# Patient Record
Sex: FEMALE | Race: WHITE | NOT HISPANIC OR LATINO | URBAN - METROPOLITAN AREA
[De-identification: names, ages, dates, MRNs, and addresses within clinical notes are randomized per-mention and may not be internally consistent; named-entity substitution may affect disease eponyms.]

---

## 2024-07-31 NOTE — PROGRESS NOTES
INITIAL EVALUATION     PROBLEM LIST:  1. GSM  2. Vaginal atrophy       HISTORY OF PRESENT ILLNESS:   Katerina Abdi is a 79 y.o. female who presents to me today for evaluation of vaginal atrophy. She was diagnosed with vaginal atrophy in 2022 and was put on estradiol 0.01% vaginal cream and Myrbetriq 50 mg in 2022 which worked well for her. Her provider at the time wanted to switch her to highest dosage of amitriptyline which she did not want to use. She has a history of UTI's and experiencing intense burning, urinary urgency, and sees an occasional filmy tissue in the toilet.     PAST MEDICAL HISTORY:  No past medical history on file.    PAST SURGICAL HISTORY:  No past surgical history on file.     ALLERGIES:   Allergies   Allergen Reactions    Cefprozil Unknown           SOCIAL HISTORY:  Patient     Social History     Socioeconomic History    Marital status:      Spouse name: Not on file    Number of children: Not on file    Years of education: Not on file    Highest education level: Not on file   Occupational History    Not on file   Tobacco Use    Smoking status: Not on file    Smokeless tobacco: Not on file   Substance and Sexual Activity    Alcohol use: Not on file    Drug use: Not on file    Sexual activity: Not on file   Other Topics Concern    Not on file   Social History Narrative    Not on file     Social Determinants of Health     Financial Resource Strain: Not on file   Food Insecurity: Not on file   Transportation Needs: Not on file   Physical Activity: Not on file   Stress: Not on file   Social Connections: Not on file   Intimate Partner Violence: Not on file   Housing Stability: Not on file       FAMILY HISTORY:  No family history on file.    REVIEW OF SYSTEMS:  Constitutional: Negative for fever and chills. Denies anorexia, weight loss.  Eyes: Negative for visual disturbance.   Respiratory: Negative for shortness of breath.    Cardiovascular: Negative for chest pain.   Gastrointestinal:  "Negative for nausea and vomiting.   Genitourinary: See interval history above.  Skin: Negative for rash.   Neurological: Negative for dizziness and numbness.   Psychiatric/Behavioral: Negative for confusion and decreased concentration.     PHYSICAL EXAM:  Blood pressure 148/81, pulse 68, temperature 36.4 °C (97.5 °F), height 1.651 m (5' 5\"), weight 71.7 kg (158 lb).  Constitutional: Patient appears well-developed and well-nourished. No distress.    Head: Normocephalic and atraumatic.    Neck: Normal range of motion.    Cardiovascular: Normal rate.    Pulmonary/Chest: Effort normal. No respiratory distress.   Musculoskeletal: Normal range of motion.    Neurological: Alert and oriented to person, place, and time.  Psychiatric: Normal mood and affect. Behavior is normal. Thought content normal.       : no signs of caruncle, or dermatosis    Assessment:      1. Gross hematuria  US renal complete    Urine culture    Urine culture      2. Genitourinary syndrome of menopause            Katerina Abdi is a 79 y.o. female with GSM and vaginal atrophy. We collected a urinanalysis today which was negative for UTI. She will start Intrarosa 6.5 mg insert and stop taking the Myrbetriq. She will also start taking Macrobid BID for the next 7 days.      Plan:   Prescription for Intrarosa 6.5 mg insert sent to the patient's pharmacy.   Prescription for Macrobid 100 mg sent to the patient's pharmacy.   Order renal ultrasound.   If no UTI will recommend cysto due to ?hematuria            Scribe Attestation  By signing my name below, IMaddy Scribe   attest that this documentation has been prepared under the direction and in the presence of Jeni Falcon MD MPH.  "

## 2024-08-02 ENCOUNTER — APPOINTMENT (OUTPATIENT)
Dept: UROLOGY | Facility: CLINIC | Age: 79
End: 2024-08-02
Payer: MEDICARE

## 2024-08-02 VITALS
TEMPERATURE: 97.5 F | DIASTOLIC BLOOD PRESSURE: 81 MMHG | WEIGHT: 158 LBS | HEART RATE: 68 BPM | BODY MASS INDEX: 26.33 KG/M2 | SYSTOLIC BLOOD PRESSURE: 148 MMHG | HEIGHT: 65 IN

## 2024-08-02 DIAGNOSIS — N95.8 GENITOURINARY SYNDROME OF MENOPAUSE: ICD-10-CM

## 2024-08-02 DIAGNOSIS — R31.0 GROSS HEMATURIA: ICD-10-CM

## 2024-08-02 PROCEDURE — 87186 SC STD MICRODIL/AGAR DIL: CPT

## 2024-08-02 PROCEDURE — 1159F MED LIST DOCD IN RCRD: CPT | Performed by: OBSTETRICS & GYNECOLOGY

## 2024-08-02 PROCEDURE — 99204 OFFICE O/P NEW MOD 45 MIN: CPT | Performed by: OBSTETRICS & GYNECOLOGY

## 2024-08-02 PROCEDURE — 87086 URINE CULTURE/COLONY COUNT: CPT

## 2024-08-02 RX ORDER — AMOXICILLIN 500 MG/1
CAPSULE ORAL
COMMUNITY
Start: 2024-07-22

## 2024-08-02 RX ORDER — METHENAMINE HIPPURATE 1000 MG/1
1 TABLET ORAL DAILY
COMMUNITY
Start: 2024-06-26 | End: 2024-08-25

## 2024-08-02 RX ORDER — PRASTERONE 6.5 MG/1
6.5 INSERT VAGINAL NIGHTLY
Qty: 30 EACH | Refills: 5 | Status: SHIPPED | OUTPATIENT
Start: 2024-08-02

## 2024-08-02 RX ORDER — CARVEDILOL 6.25 MG/1
6.25 TABLET ORAL DAILY PRN
COMMUNITY
Start: 2016-11-29

## 2024-08-02 RX ORDER — NITROFURANTOIN 25; 75 MG/1; MG/1
100 CAPSULE ORAL 2 TIMES DAILY
Qty: 14 CAPSULE | Refills: 0 | Status: SHIPPED | OUTPATIENT
Start: 2024-08-02 | End: 2024-08-09

## 2024-08-02 RX ORDER — FLUTICASONE PROPIONATE 50 MCG
1 SPRAY, SUSPENSION (ML) NASAL DAILY PRN
COMMUNITY
Start: 2023-01-07

## 2024-08-02 RX ORDER — MIRABEGRON 50 MG/1
50 TABLET, FILM COATED, EXTENDED RELEASE ORAL
COMMUNITY
Start: 2015-11-24

## 2024-08-02 RX ORDER — ALPRAZOLAM 0.25 MG/1
0.25 TABLET ORAL EVERY 8 HOURS PRN
COMMUNITY

## 2024-08-02 RX ORDER — ESTRADIOL 0.1 MG/G
2 CREAM VAGINAL 2 TIMES WEEKLY
COMMUNITY

## 2024-08-02 RX ORDER — ALENDRONATE SODIUM 70 MG/1
1 TABLET ORAL WEEKLY
COMMUNITY
Start: 2024-06-22

## 2024-08-02 RX ORDER — LOSARTAN POTASSIUM AND HYDROCHLOROTHIAZIDE 12.5; 1 MG/1; MG/1
1 TABLET ORAL
COMMUNITY
Start: 2024-06-22

## 2024-08-04 LAB — BACTERIA UR CULT: ABNORMAL

## 2024-08-05 LAB — BACTERIA UR CULT: ABNORMAL

## 2024-08-06 DIAGNOSIS — N39.0 ACUTE URINARY TRACT INFECTION: ICD-10-CM

## 2024-08-06 RX ORDER — CIPROFLOXACIN 500 MG/1
500 TABLET ORAL 2 TIMES DAILY
Qty: 14 TABLET | Refills: 0 | Status: SHIPPED | OUTPATIENT
Start: 2024-08-06 | End: 2024-08-13

## 2024-08-13 ENCOUNTER — HOSPITAL ENCOUNTER (OUTPATIENT)
Dept: RADIOLOGY | Facility: CLINIC | Age: 79
Discharge: HOME | End: 2024-08-13
Payer: MEDICARE

## 2024-08-13 DIAGNOSIS — R31.0 GROSS HEMATURIA: ICD-10-CM

## 2024-08-13 PROCEDURE — 76770 US EXAM ABDO BACK WALL COMP: CPT | Performed by: STUDENT IN AN ORGANIZED HEALTH CARE EDUCATION/TRAINING PROGRAM

## 2024-08-13 PROCEDURE — 76770 US EXAM ABDO BACK WALL COMP: CPT

## 2024-08-16 DIAGNOSIS — Z13.228 SCREENING FOR METABOLIC DISORDER: ICD-10-CM

## 2024-08-16 DIAGNOSIS — R31.0 GROSS HEMATURIA: ICD-10-CM

## 2024-08-21 ENCOUNTER — LAB (OUTPATIENT)
Dept: LAB | Facility: LAB | Age: 79
End: 2024-08-21
Payer: MEDICARE

## 2024-08-21 DIAGNOSIS — Z13.228 SCREENING FOR METABOLIC DISORDER: ICD-10-CM

## 2024-08-21 LAB
CREAT SERPL-MCNC: 0.71 MG/DL (ref 0.5–1.05)
EGFRCR SERPLBLD CKD-EPI 2021: 87 ML/MIN/1.73M*2

## 2024-08-21 PROCEDURE — 82565 ASSAY OF CREATININE: CPT

## 2024-08-21 PROCEDURE — 36415 COLL VENOUS BLD VENIPUNCTURE: CPT

## 2024-08-27 ENCOUNTER — HOSPITAL ENCOUNTER (OUTPATIENT)
Dept: RADIOLOGY | Facility: HOSPITAL | Age: 79
Discharge: HOME | End: 2024-08-27
Payer: MEDICARE

## 2024-08-27 DIAGNOSIS — R31.0 GROSS HEMATURIA: ICD-10-CM

## 2024-08-27 PROCEDURE — 74178 CT ABD&PLV WO CNTR FLWD CNTR: CPT | Performed by: RADIOLOGY

## 2024-08-27 PROCEDURE — 76377 3D RENDER W/INTRP POSTPROCES: CPT

## 2024-08-27 PROCEDURE — 76377 3D RENDER W/INTRP POSTPROCES: CPT | Performed by: RADIOLOGY

## 2024-08-27 PROCEDURE — 2550000001 HC RX 255 CONTRASTS: Performed by: OBSTETRICS & GYNECOLOGY

## 2024-08-30 ENCOUNTER — APPOINTMENT (OUTPATIENT)
Dept: UROLOGY | Facility: CLINIC | Age: 79
End: 2024-08-30
Payer: MEDICARE

## 2024-09-30 ENCOUNTER — APPOINTMENT (OUTPATIENT)
Dept: UROLOGY | Facility: CLINIC | Age: 79
End: 2024-09-30
Payer: MEDICARE

## 2024-09-30 VITALS — HEIGHT: 65 IN | WEIGHT: 155 LBS | BODY MASS INDEX: 25.83 KG/M2 | TEMPERATURE: 96.9 F

## 2024-09-30 DIAGNOSIS — N21.0 CALCULUS, BLADDER: ICD-10-CM

## 2024-09-30 DIAGNOSIS — R31.0 GROSS HEMATURIA: Primary | ICD-10-CM

## 2024-09-30 DIAGNOSIS — N20.0 KIDNEY STONES: ICD-10-CM

## 2024-09-30 PROBLEM — I10 HYPERTENSION: Status: ACTIVE | Noted: 2024-09-30

## 2024-09-30 PROBLEM — R39.9 UTI SYMPTOMS: Status: ACTIVE | Noted: 2024-07-03

## 2024-09-30 PROBLEM — J06.9 ACUTE UPPER RESPIRATORY INFECTION: Status: ACTIVE | Noted: 2024-09-30

## 2024-09-30 PROBLEM — N95.2 VAGINAL ATROPHY: Status: ACTIVE | Noted: 2022-01-01

## 2024-09-30 LAB
POC APPEARANCE, URINE: ABNORMAL
POC BILIRUBIN, URINE: NEGATIVE
POC BLOOD, URINE: ABNORMAL
POC COLOR, URINE: YELLOW
POC GLUCOSE, URINE: NEGATIVE MG/DL
POC KETONES, URINE: NEGATIVE MG/DL
POC LEUKOCYTES, URINE: ABNORMAL
POC NITRITE,URINE: NEGATIVE
POC PH, URINE: 7 PH
POC PROTEIN, URINE: ABNORMAL MG/DL
POC SPECIFIC GRAVITY, URINE: >=1.03
POC UROBILINOGEN, URINE: 0.2 EU/DL

## 2024-09-30 PROCEDURE — 1159F MED LIST DOCD IN RCRD: CPT | Performed by: SURGERY

## 2024-09-30 PROCEDURE — 1125F AMNT PAIN NOTED PAIN PRSNT: CPT | Performed by: SURGERY

## 2024-09-30 PROCEDURE — 99204 OFFICE O/P NEW MOD 45 MIN: CPT | Performed by: SURGERY

## 2024-09-30 PROCEDURE — 1036F TOBACCO NON-USER: CPT | Performed by: SURGERY

## 2024-09-30 PROCEDURE — 81002 URINALYSIS NONAUTO W/O SCOPE: CPT | Performed by: SURGERY

## 2024-09-30 PROCEDURE — 1160F RVW MEDS BY RX/DR IN RCRD: CPT | Performed by: SURGERY

## 2024-09-30 ASSESSMENT — PAIN SCALES - GENERAL: PAINLEVEL: 3

## 2024-09-30 NOTE — PROGRESS NOTES
Subjective   Patient ID: Katerina Abdi is a 79 y.o. female who presents for bladder stones.      HPI  She was referred to me by Dr. Jeni Falcon.  She has been treated for postmenopausal vaginitis.  She has been having recurrent irritative voiding symptoms, notably dysuria and frequency.  She previously was treated with Myrbetriq.  Further investigation revealed microscopic hematuria.  This prompted imaging, and a CT scan done revealed a 3 cm calculus within the urinary bladder.  She reports a prior history of kidney stones.      Review of Systems  As per HPI, remaining 10 systems negative        Objective   Physical Exam  Well nourished  Breathing comfortably  Abdomen soft, ND, NT      Imaging Reviewed: CT Urogram  Both kidneys are normal in size.  There is no evidence of any renal masses, stones, or hydronephrosis.  Within the bladder there is a 3 cm ovoid calculus.  The ureters are unremarkable.          Assessment/Plan   Problem List Items Addressed This Visit    None  Visit Diagnoses         Codes    Gross hematuria    -  Primary R31.0    Relevant Orders    POCT UA (nonautomated) manually resulted (Completed)    Calculus, bladder     N21.0    Relevant Orders    Case Request Operating Room: Cystoscopy with Litholapaxy (Completed)               I reviewed her imaging findings at length.  She has a large calculus within the bladder.  We discussed treatment options.  The best option is a laser cystolitholapaxy.  I counseled her on the risks of surgery.  I will schedule this as soon as possible.        Hakan Coles MD 09/30/24 11:00 AM

## 2024-10-01 PROBLEM — N21.0 CALCULUS, BLADDER: Status: ACTIVE | Noted: 2024-09-30

## 2024-10-30 ENCOUNTER — LAB (OUTPATIENT)
Dept: LAB | Facility: LAB | Age: 79
End: 2024-10-30
Payer: MEDICARE

## 2024-10-30 ENCOUNTER — PRE-ADMISSION TESTING (OUTPATIENT)
Dept: PREADMISSION TESTING | Facility: HOSPITAL | Age: 79
End: 2024-10-30
Payer: MEDICARE

## 2024-10-30 VITALS
SYSTOLIC BLOOD PRESSURE: 148 MMHG | HEIGHT: 65 IN | TEMPERATURE: 98.6 F | BODY MASS INDEX: 26.11 KG/M2 | WEIGHT: 156.7 LBS | OXYGEN SATURATION: 99 % | HEART RATE: 67 BPM | DIASTOLIC BLOOD PRESSURE: 67 MMHG

## 2024-10-30 DIAGNOSIS — Z01.818 PREOP TESTING: ICD-10-CM

## 2024-10-30 DIAGNOSIS — Z01.818 PREOPERATIVE TESTING: Primary | ICD-10-CM

## 2024-10-30 DIAGNOSIS — Z01.818 PREOPERATIVE TESTING: ICD-10-CM

## 2024-10-30 LAB
ANION GAP SERPL CALCULATED.3IONS-SCNC: 11 MMOL/L (ref 10–20)
BUN SERPL-MCNC: 17 MG/DL (ref 6–23)
CALCIUM SERPL-MCNC: 9.4 MG/DL (ref 8.6–10.3)
CHLORIDE SERPL-SCNC: 109 MMOL/L (ref 98–107)
CO2 SERPL-SCNC: 27 MMOL/L (ref 21–32)
CREAT SERPL-MCNC: 0.76 MG/DL (ref 0.5–1.05)
EGFRCR SERPLBLD CKD-EPI 2021: 80 ML/MIN/1.73M*2
ERYTHROCYTE [DISTWIDTH] IN BLOOD BY AUTOMATED COUNT: 13.1 % (ref 11.5–14.5)
GLUCOSE SERPL-MCNC: 86 MG/DL (ref 74–99)
HCT VFR BLD AUTO: 38.7 % (ref 36–46)
HGB BLD-MCNC: 12.7 G/DL (ref 12–16)
MCH RBC QN AUTO: 31.3 PG (ref 26–34)
MCHC RBC AUTO-ENTMCNC: 32.8 G/DL (ref 32–36)
MCV RBC AUTO: 95 FL (ref 80–100)
NRBC BLD-RTO: 0 /100 WBCS (ref 0–0)
PLATELET # BLD AUTO: 327 X10*3/UL (ref 150–450)
POTASSIUM SERPL-SCNC: 4.3 MMOL/L (ref 3.5–5.3)
RBC # BLD AUTO: 4.06 X10*6/UL (ref 4–5.2)
SODIUM SERPL-SCNC: 143 MMOL/L (ref 136–145)
WBC # BLD AUTO: 7.8 X10*3/UL (ref 4.4–11.3)

## 2024-10-30 PROCEDURE — 99203 OFFICE O/P NEW LOW 30 MIN: CPT | Performed by: NURSE PRACTITIONER

## 2024-10-30 PROCEDURE — 85027 COMPLETE CBC AUTOMATED: CPT

## 2024-10-30 PROCEDURE — 36415 COLL VENOUS BLD VENIPUNCTURE: CPT

## 2024-10-30 PROCEDURE — 80048 BASIC METABOLIC PNL TOTAL CA: CPT

## 2024-10-30 RX ORDER — NAPROXEN SODIUM 220 MG/1
81 TABLET, FILM COATED ORAL DAILY
COMMUNITY

## 2024-10-30 ASSESSMENT — DUKE ACTIVITY SCORE INDEX (DASI)
DASI METS SCORE: 8
CAN YOU DO YARD WORK LIKE RAKING LEAVES, WEEDING OR PUSHING A MOWER: YES
CAN YOU PARTICIPATE IN STRENOUS SPORTS LIKE SWIMMING, SINGLES TENNIS, FOOTBALL, BASKETBALL, OR SKIING: NO
CAN YOU DO LIGHT WORK AROUND THE HOUSE LIKE DUSTING OR WASHING DISHES: YES
TOTAL_SCORE: 42.7
CAN YOU WALK A BLOCK OR TWO ON LEVEL GROUND: YES
CAN YOU RUN A SHORT DISTANCE: NO
CAN YOU TAKE CARE OF YOURSELF (EAT, DRESS, BATHE, OR USE TOILET): YES
CAN YOU CLIMB A FLIGHT OF STAIRS OR WALK UP A HILL: YES
CAN YOU DO HEAVY WORK AROUND THE HOUSE LIKE SCRUBBING FLOORS OR LIFTING AND MOVING HEAVY FURNITURE: YES
CAN YOU DO MODERATE WORK AROUND THE HOUSE LIKE VACUUMING, SWEEPING FLOORS OR CARRYING GROCERIES: YES
CAN YOU WALK INDOORS, SUCH AS AROUND YOUR HOUSE: YES
CAN YOU HAVE SEXUAL RELATIONS: YES
CAN YOU PARTICIPATE IN MODERATE RECREATIONAL ACTIVITIES LIKE GOLF, BOWLING, DANCING, DOUBLES TENNIS OR THROWING A BASEBALL OR FOOTBALL: YES

## 2024-10-30 ASSESSMENT — PAIN DESCRIPTION - DESCRIPTORS: DESCRIPTORS: BURNING;DISCOMFORT

## 2024-10-30 ASSESSMENT — ENCOUNTER SYMPTOMS
HEMATOLOGIC/LYMPHATIC NEGATIVE: 1
CARDIOVASCULAR NEGATIVE: 1
EYES NEGATIVE: 1
PSYCHIATRIC NEGATIVE: 1
NEUROLOGICAL NEGATIVE: 1
FREQUENCY: 1
MUSCULOSKELETAL NEGATIVE: 1
ALLERGIC/IMMUNOLOGIC NEGATIVE: 1
RESPIRATORY NEGATIVE: 1
ENDOCRINE NEGATIVE: 1
CONSTITUTIONAL NEGATIVE: 1
GASTROINTESTINAL NEGATIVE: 1
DYSURIA: 1

## 2024-10-30 ASSESSMENT — PAIN - FUNCTIONAL ASSESSMENT: PAIN_FUNCTIONAL_ASSESSMENT: 0-10

## 2024-10-30 ASSESSMENT — PAIN SCALES - GENERAL: PAINLEVEL_OUTOF10: 3

## 2024-10-30 NOTE — H&P (VIEW-ONLY)
[facility]/PAT Evaluation       Name: Katerina Abdi (Katerina Abdi)  /Age: 1945/79 y.o.     In-Person       Chief Complaint: bladder stone    HPI    79 year old female with bladder stones. Has irritative voiding symptoms-dysuria and frequency. CT scan 24 showed a 3 cm calculus in urinary bladder. Denies fever, chills, nausea, vomiting, chest pain, or SOB.    Scheduled for cystoscopy with litholapaxy 24    Past Medical History:   Diagnosis Date    Fibroid     HYSTERECTOMY    Fractures     Right ankle    Hypertension     Joint pain     Two knee replacements    Urinary tract infection     Occasionally       Past Surgical History:   Procedure Laterality Date    ANKLE FRACTURE SURGERY  ?    CATARACT EXTRACTION      COLONOSCOPY      said more can't be done due to curvature of colon.    HYSTERECTOMY      JOINT REPLACEMENT  ,     Knee replacements    OTHER SURGICAL HISTORY  ,, ankle?,     knee replacements, right ankle, hysterectomy    TONSILLECTOMY  about 6 years old       Patient  reports that she is not currently sexually active and has had partner(s) who are male. She reports using the following method of birth control/protection: None.    Family History   Problem Relation Name Age of Onset    Heart disease Father iMke Smith     Hypertension Father Mike Smith        Allergies   Allergen Reactions    Cefprozil Unknown       Prior to Admission medications    Medication Sig Start Date End Date Taking? Authorizing Provider   alendronate (Fosamax) 70 mg tablet Take 1 tablet (70 mg) by mouth once a week. 24   Historical Provider, MD   ALPRAZolam (Xanax) 0.25 mg tablet Take 1 tablet (0.25 mg) by mouth every 8 hours if needed.    Historical Provider, MD   amoxicillin (Amoxil) 500 mg capsule TAKE 4 CAPSULES BY MOUTH ONE HOUR PRIOR TO DENTAL APPOINTMENT 24   Historical Provider, MD   carvedilol (Coreg) 6.25 mg tablet Take 1 tablet (6.25 mg) by mouth once daily as  needed. 11/29/16   Historical Provider, MD   fluticasone (Flonase) 50 mcg/actuation nasal spray Administer 1 spray into each nostril once daily as needed. 1/7/23   Historical Provider, MD   losartan-hydrochlorothiazide (Hyzaar) 100-12.5 mg tablet Take 1 tablet by mouth early in the morning.. 6/22/24   Historical Provider, MD   Myrbetriq 50 mg tablet extended release 24 hr 24 hr tablet Take 1 tablet (50 mg) by mouth once daily. 11/24/15   Historical Provider, MD   prasterone, dhea, (Intrarosa) 6.5 mg insert Insert 6.5 mg into the vagina once daily at bedtime. 8/2/24   Jeni Falcon MD MPH      Review of Systems   Constitutional: Negative.    HENT: Negative.     Eyes: Negative.    Respiratory: Negative.     Cardiovascular: Negative.    Gastrointestinal: Negative.    Endocrine: Negative.    Genitourinary:  Positive for dysuria and frequency.   Musculoskeletal: Negative.    Skin: Negative.    Allergic/Immunologic: Negative.    Neurological: Negative.    Hematological: Negative.    Psychiatric/Behavioral: Negative.           Physical Exam  HENT:      Head: Normocephalic.      Mouth/Throat:      Mouth: Mucous membranes are moist.   Eyes:      Extraocular Movements: Extraocular movements intact.      Pupils: Pupils are equal, round, and reactive to light.   Cardiovascular:      Rate and Rhythm: Normal rate and regular rhythm.      Pulses: Normal pulses.      Heart sounds: Normal heart sounds.   Pulmonary:      Effort: Pulmonary effort is normal.      Breath sounds: Normal breath sounds.   Abdominal:      General: Bowel sounds are normal.   Musculoskeletal:         General: Normal range of motion.   Skin:     General: Skin is warm.      Capillary Refill: Capillary refill takes less than 2 seconds.   Neurological:      General: No focal deficit present.      Mental Status: She is alert.   Psychiatric:         Mood and Affect: Mood normal.        PAT AIRWAY:   Airway:     Mallampati::  III    Neck ROM::  Full  normal        BP  "148/67   Pulse 67   Temp 37 °C (98.6 °F) (Temporal)   Ht 1.651 m (5' 5\")   Wt 71.1 kg (156 lb 11.2 oz)   SpO2 99%   BMI 26.08 kg/m²         DASI Risk Score      Flowsheet Row Pre-Admission Testing from 10/30/2024 in [facility]   Can you take care of yourself (eat, dress, bathe, or use toilet)?  2.75 filed at 10/30/2024 1224   Can you walk indoors, such as around your house? 1.75 filed at 10/30/2024 1224   Can you walk a block or two on level ground?  2.75 filed at 10/30/2024 1224   Can you climb a flight of stairs or walk up a hill? 5.5 filed at 10/30/2024 1224   Can you run a short distance? 0 filed at 10/30/2024 1224   Can you do light work around the house like dusting or washing dishes? 2.7 filed at 10/30/2024 1224   Can you do moderate work around the house like vacuuming, sweeping floors or carrying groceries? 3.5 filed at 10/30/2024 1224   Can you do heavy work around the house like scrubbing floors or lifting and moving heavy furniture?  8 filed at 10/30/2024 1224   Can you do yard work like raking leaves, weeding or pushing a mower? 4.5 filed at 10/30/2024 1224   Can you have sexual relations? 5.25 filed at 10/30/2024 1224   Can you participate in moderate recreational activities like golf, bowling, dancing, doubles tennis or throwing a baseball or football? 6 filed at 10/30/2024 1224   Can you participate in strenous sports like swimming, singles tennis, football, basketball, or skiing? 0 filed at 10/30/2024 1224   DASI SCORE 42.7 filed at 10/30/2024 1224   METS Score (Will be calculated only when all the questions are answered) 8 filed at 10/30/2024 1224          Caprini DVT Assessment    No data to display       Modified Frailty Index    No data to display       CHADS2 Stroke Risk  Current as of about an hour ago        N/A 3 to 100%: High Risk   2 to < 3%: Medium Risk   0 to < 2%: Low Risk     Last Change: N/A          This score determines the patient's risk of having a stroke if " the patient has atrial fibrillation.        This score is not applicable to this patient. Components are not calculated.          Revised Cardiac Risk Index      Flowsheet Row Pre-Admission Testing from 10/30/2024 in [facility]   High-Risk Surgery (Intraperitoneal, Intrathoracic,Suprainguinal vascular) 0 filed at 10/30/2024 1229   History of congestive heart failure (pulmonary edemia, bilateral rales or S3 gallop, Paroxysmal nocturnal dyspnea, CXR showing pulmonary vascular redistribution) 0 filed at 10/30/2024 1229   History of cerebrovascular disease (Prior TIA or stroke) 0 filed at 10/30/2024 1229   Pre-operative insulin treatment 0 filed at 10/30/2024 1229   Pre-operative creatinine>2 mg/dl 0 filed at 10/30/2024 1229          Apfel Simplified Score    No data to display       Risk Analysis Index Results This Encounter    No data found in the last 10 encounters.       Stop Bang Score      Flowsheet Row Pre-Admission Testing from 10/30/2024 in [facility]   Do you snore loudly? 0 filed at 10/30/2024 1224   Do you often feel tired or fatigued after your sleep? 0 filed at 10/30/2024 1224   Has anyone ever observed you stop breathing in your sleep? 0 filed at 10/30/2024 1224   Do you have or are you being treated for high blood pressure? 1 filed at 10/30/2024 1224   Recent BMI (Calculated) 26.1 filed at 10/30/2024 1224   Is BMI greater than 35 kg/m2? 0=No filed at 10/30/2024 1224   Age older than 50 years old? 1=Yes filed at 10/30/2024 1224   Is your neck circumference greater than 17 inches (Male) or 16 inches (Female)? 0 filed at 10/30/2024 1224   Gender - Male 0=No filed at 10/30/2024 1224   STOP-BANG Total Score 2 filed at 10/30/2024 1224          Prodigy: High Risk  Total Score: 12              Prodigy Age Score           ARISCAT Score for Postoperative Pulmonary Complications    No data to display       Bishop Perioperative Risk for Myocardial Infarction or Cardiac Arrest (ALAYNA)     No data to display       ASA: II  DASI Risk Score: 42.7  METS: 8  CHADS2: 1  REVISED CARDIAC RISK INDEX: 0.4%  STOPBAN      Assessment and Plan:     Calculus, bladder- scheduled for cystoscopy with litholapaxy 24  HTN-controlled with meds

## 2024-11-06 ENCOUNTER — ANESTHESIA (OUTPATIENT)
Dept: OPERATING ROOM | Facility: HOSPITAL | Age: 79
End: 2024-11-06
Payer: MEDICARE

## 2024-11-06 ENCOUNTER — ANESTHESIA EVENT (OUTPATIENT)
Dept: OPERATING ROOM | Facility: HOSPITAL | Age: 79
End: 2024-11-06
Payer: MEDICARE

## 2024-11-06 ENCOUNTER — HOSPITAL ENCOUNTER (OUTPATIENT)
Facility: HOSPITAL | Age: 79
Setting detail: OUTPATIENT SURGERY
Discharge: HOME | End: 2024-11-06
Attending: SURGERY | Admitting: SURGERY
Payer: MEDICARE

## 2024-11-06 VITALS
HEART RATE: 79 BPM | DIASTOLIC BLOOD PRESSURE: 85 MMHG | TEMPERATURE: 98.2 F | OXYGEN SATURATION: 100 % | HEIGHT: 65 IN | RESPIRATION RATE: 16 BRPM | BODY MASS INDEX: 26.11 KG/M2 | WEIGHT: 156.7 LBS | SYSTOLIC BLOOD PRESSURE: 167 MMHG

## 2024-11-06 DIAGNOSIS — N21.0 CALCULUS, BLADDER: Primary | ICD-10-CM

## 2024-11-06 PROCEDURE — 7100000010 HC PHASE TWO TIME - EACH INCREMENTAL 1 MINUTE: Performed by: SURGERY

## 2024-11-06 PROCEDURE — 3600000008 HC OR TIME - EACH INCREMENTAL 1 MINUTE - PROCEDURE LEVEL THREE: Performed by: SURGERY

## 2024-11-06 PROCEDURE — 52318 REMOVE BLADDER STONE: CPT | Performed by: SURGERY

## 2024-11-06 PROCEDURE — 3600000003 HC OR TIME - INITIAL BASE CHARGE - PROCEDURE LEVEL THREE: Performed by: SURGERY

## 2024-11-06 PROCEDURE — 3700000001 HC GENERAL ANESTHESIA TIME - INITIAL BASE CHARGE: Performed by: SURGERY

## 2024-11-06 PROCEDURE — 3700000002 HC GENERAL ANESTHESIA TIME - EACH INCREMENTAL 1 MINUTE: Performed by: SURGERY

## 2024-11-06 PROCEDURE — 7100000009 HC PHASE TWO TIME - INITIAL BASE CHARGE: Performed by: SURGERY

## 2024-11-06 PROCEDURE — 7100000002 HC RECOVERY ROOM TIME - EACH INCREMENTAL 1 MINUTE: Performed by: SURGERY

## 2024-11-06 PROCEDURE — 7100000001 HC RECOVERY ROOM TIME - INITIAL BASE CHARGE: Performed by: SURGERY

## 2024-11-06 PROCEDURE — 2720000007 HC OR 272 NO HCPCS: Performed by: SURGERY

## 2024-11-06 PROCEDURE — 2500000004 HC RX 250 GENERAL PHARMACY W/ HCPCS (ALT 636 FOR OP/ED): Performed by: ANESTHESIOLOGIST ASSISTANT

## 2024-11-06 PROCEDURE — A52318 PR REMOVE BLADDER STONE,>2.5 CM

## 2024-11-06 PROCEDURE — 99100 ANES PT EXTEME AGE<1 YR&>70: CPT | Performed by: STUDENT IN AN ORGANIZED HEALTH CARE EDUCATION/TRAINING PROGRAM

## 2024-11-06 PROCEDURE — A52318 PR REMOVE BLADDER STONE,>2.5 CM: Performed by: STUDENT IN AN ORGANIZED HEALTH CARE EDUCATION/TRAINING PROGRAM

## 2024-11-06 PROCEDURE — 2500000004 HC RX 250 GENERAL PHARMACY W/ HCPCS (ALT 636 FOR OP/ED): Mod: JZ | Performed by: SURGERY

## 2024-11-06 PROCEDURE — 2500000004 HC RX 250 GENERAL PHARMACY W/ HCPCS (ALT 636 FOR OP/ED): Performed by: STUDENT IN AN ORGANIZED HEALTH CARE EDUCATION/TRAINING PROGRAM

## 2024-11-06 PROCEDURE — 2500000004 HC RX 250 GENERAL PHARMACY W/ HCPCS (ALT 636 FOR OP/ED)

## 2024-11-06 RX ORDER — ONDANSETRON HYDROCHLORIDE 2 MG/ML
INJECTION, SOLUTION INTRAVENOUS AS NEEDED
Status: DISCONTINUED | OUTPATIENT
Start: 2024-11-06 | End: 2024-11-06

## 2024-11-06 RX ORDER — LIDOCAINE HYDROCHLORIDE 10 MG/ML
INJECTION, SOLUTION INFILTRATION; PERINEURAL AS NEEDED
Status: DISCONTINUED | OUTPATIENT
Start: 2024-11-06 | End: 2024-11-06

## 2024-11-06 RX ORDER — OXYCODONE HYDROCHLORIDE 5 MG/1
10 TABLET ORAL EVERY 4 HOURS PRN
Status: DISCONTINUED | OUTPATIENT
Start: 2024-11-06 | End: 2024-11-06 | Stop reason: HOSPADM

## 2024-11-06 RX ORDER — MEPERIDINE HYDROCHLORIDE 25 MG/ML
12.5 INJECTION INTRAMUSCULAR; INTRAVENOUS; SUBCUTANEOUS
Status: DISCONTINUED | OUTPATIENT
Start: 2024-11-06 | End: 2024-11-06 | Stop reason: HOSPADM

## 2024-11-06 RX ORDER — ONDANSETRON HYDROCHLORIDE 2 MG/ML
4 INJECTION, SOLUTION INTRAVENOUS ONCE AS NEEDED
Status: DISCONTINUED | OUTPATIENT
Start: 2024-11-06 | End: 2024-11-06 | Stop reason: HOSPADM

## 2024-11-06 RX ORDER — ACETAMINOPHEN 325 MG/1
650 TABLET ORAL EVERY 4 HOURS PRN
Status: DISCONTINUED | OUTPATIENT
Start: 2024-11-06 | End: 2024-11-06 | Stop reason: HOSPADM

## 2024-11-06 RX ORDER — SODIUM CHLORIDE, SODIUM LACTATE, POTASSIUM CHLORIDE, CALCIUM CHLORIDE 600; 310; 30; 20 MG/100ML; MG/100ML; MG/100ML; MG/100ML
50 INJECTION, SOLUTION INTRAVENOUS CONTINUOUS
Status: DISCONTINUED | OUTPATIENT
Start: 2024-11-06 | End: 2024-11-06 | Stop reason: HOSPADM

## 2024-11-06 RX ORDER — MIDAZOLAM HYDROCHLORIDE 1 MG/ML
INJECTION, SOLUTION INTRAMUSCULAR; INTRAVENOUS AS NEEDED
Status: DISCONTINUED | OUTPATIENT
Start: 2024-11-06 | End: 2024-11-06

## 2024-11-06 RX ORDER — FENTANYL CITRATE 50 UG/ML
INJECTION, SOLUTION INTRAMUSCULAR; INTRAVENOUS AS NEEDED
Status: DISCONTINUED | OUTPATIENT
Start: 2024-11-06 | End: 2024-11-06

## 2024-11-06 RX ORDER — ALBUTEROL SULFATE 0.83 MG/ML
2.5 SOLUTION RESPIRATORY (INHALATION) ONCE
Status: DISCONTINUED | OUTPATIENT
Start: 2024-11-06 | End: 2024-11-06 | Stop reason: HOSPADM

## 2024-11-06 RX ORDER — PROPOFOL 10 MG/ML
INJECTION, EMULSION INTRAVENOUS AS NEEDED
Status: DISCONTINUED | OUTPATIENT
Start: 2024-11-06 | End: 2024-11-06

## 2024-11-06 RX ORDER — FENTANYL CITRATE 50 UG/ML
25 INJECTION, SOLUTION INTRAMUSCULAR; INTRAVENOUS EVERY 5 MIN PRN
Status: DISCONTINUED | OUTPATIENT
Start: 2024-11-06 | End: 2024-11-06 | Stop reason: HOSPADM

## 2024-11-06 RX ORDER — OXYCODONE HYDROCHLORIDE 5 MG/1
5 TABLET ORAL EVERY 4 HOURS PRN
Status: DISCONTINUED | OUTPATIENT
Start: 2024-11-06 | End: 2024-11-06 | Stop reason: HOSPADM

## 2024-11-06 RX ORDER — CEFAZOLIN SODIUM 2 G/100ML
2 INJECTION, SOLUTION INTRAVENOUS ONCE
Status: COMPLETED | OUTPATIENT
Start: 2024-11-06 | End: 2024-11-06

## 2024-11-06 RX ORDER — FENTANYL CITRATE 50 UG/ML
50 INJECTION, SOLUTION INTRAMUSCULAR; INTRAVENOUS EVERY 5 MIN PRN
Status: DISCONTINUED | OUTPATIENT
Start: 2024-11-06 | End: 2024-11-06 | Stop reason: HOSPADM

## 2024-11-06 ASSESSMENT — PAIN - FUNCTIONAL ASSESSMENT
PAIN_FUNCTIONAL_ASSESSMENT: 0-10

## 2024-11-06 ASSESSMENT — PAIN DESCRIPTION - DESCRIPTORS
DESCRIPTORS: ACHING
DESCRIPTORS: BURNING
DESCRIPTORS: ACHING

## 2024-11-06 ASSESSMENT — COLUMBIA-SUICIDE SEVERITY RATING SCALE - C-SSRS
2. HAVE YOU ACTUALLY HAD ANY THOUGHTS OF KILLING YOURSELF?: NO
6. HAVE YOU EVER DONE ANYTHING, STARTED TO DO ANYTHING, OR PREPARED TO DO ANYTHING TO END YOUR LIFE?: NO
1. IN THE PAST MONTH, HAVE YOU WISHED YOU WERE DEAD OR WISHED YOU COULD GO TO SLEEP AND NOT WAKE UP?: NO

## 2024-11-06 ASSESSMENT — PAIN SCALES - GENERAL
PAINLEVEL_OUTOF10: 0 - NO PAIN
PAINLEVEL_OUTOF10: 3
PAINLEVEL_OUTOF10: 4
PAINLEVEL_OUTOF10: 3

## 2024-11-06 NOTE — ANESTHESIA PROCEDURE NOTES
Airway  Date/Time: 11/6/2024 2:11 PM  Urgency: elective    Airway not difficult    Staffing  Performed: BECCA   Authorized by: John Larry DO    Performed by: BECCA Neff  Patient location during procedure: OR    Indications and Patient Condition  Indications for airway management: anesthesia  Spontaneous Ventilation: absent  Sedation level: deep  Preoxygenated: yes  Mask difficulty assessment: 0 - not attempted    Final Airway Details  Final airway type: supraglottic airway      Successful airway: classic  Size 4     Number of attempts at approach: 1

## 2024-11-06 NOTE — ANESTHESIA POSTPROCEDURE EVALUATION
Patient: Katerina Abdi    Procedure Summary       Date: 11/06/24 Room / Location: [facility] OR 12 / Virtual [facility] OR    Anesthesia Start: 1404 Anesthesia Stop: 1538    Procedure: Cystoscopy with Litholapaxy Diagnosis:       Calculus, bladder      (Calculus, bladder [N21.0])    Surgeons: Hakan Coles MD Responsible Provider: John Larry DO    Anesthesia Type: general ASA Status: 3            Anesthesia Type: general    Vitals Value Taken Time   /66 11/06/24 1550   Temp 35.9 °C (96.6 °F) 11/06/24 1534   Pulse 71 11/06/24 1550   Resp 17 11/06/24 1550   SpO2 100 % 11/06/24 1550       Anesthesia Post Evaluation    Patient location during evaluation: bedside  Patient participation: complete - patient participated  Level of consciousness: awake and alert  Pain management: adequate  Multimodal analgesia pain management approach  Airway patency: patent  Two or more strategies used to mitigate risk of obstructive sleep apnea  Cardiovascular status: acceptable  Respiratory status: acceptable  Hydration status: acceptable  Postoperative Nausea and Vomiting: none        There were no known notable events for this encounter.

## 2024-11-06 NOTE — POST-PROCEDURE NOTE
Patient in phase ll and stable. No c/o pain, just some mild lower back pain from positioning.  at bedside. Pt going home with indwelling catheter with appointment for removal tomorrow afternoon in office. Pt and family educated and printed instructions given about heath care and demonstration of how to empty it over night. AVS reviewed and questions answered at this time.  250 mL of clear red urine emptied from heath before DC.

## 2024-11-06 NOTE — OP NOTE
Cystoscopy with Litholapaxy Operative Note     Date: 2024  OR Location: [facility] OR    Name: Katerina Abdi, : 1945, Age: 79 y.o., MRN: [id number], Sex: female    Diagnosis  Pre-op Diagnosis      * Calculus, bladder [N21.0] Post-op Diagnosis     * Calculus, bladder [N21.0]     Procedures  Cystoscopy with Litholapaxy  57400 - [facility] LITHOLAPAXY COMP/LG > 2.5 CM      Surgeons      * Hakan Coles - Primary    Resident/Fellow/Other Assistant:  Surgeons and Role:  * No surgeons found with a matching role *    Staff:   Circulator: Lola  Circulator: Abdifatah  Circulator: Samantha  Scrub Person: Reji  Scrub Person: Morenita Costa Circulator: Jw    Anesthesia Staff: Anesthesiologist: John Larry DO  C-AA: BECCA Lorenzo; BECCA Neff    Procedure Summary  Anesthesia: General  ASA: III  Estimated Blood Loss: 10 mL  Intra-op Medications: Administrations occurring from 1230 to 1400 on 24:  * No intraprocedure medications in log *           Anesthesia Record               Intraprocedure I/O Totals       None           Specimen: No specimens collected              Drains and/or Catheters:   Urethral Catheter Latex 18 Fr. (Active)       Tourniquet Times:         Implants:     Findings: 1. Inflammatory bladder mucosa.                   2. Large bladder calculus, 3 x 3 x 3 cm.        Indications: Katerina Abdi is an 79 y.o. female who is having surgery for Calculus, bladder [N21.0].  She was referred for irritative voiding symptoms.  She had abdominal imaging which revealed a large stone occupying the urinary bladder.  We discussed treatment options and she wished to proceed with a laser ablation.    The patient was seen in the preoperative area. The risks, benefits, complications, treatment options, non-operative alternatives, expected recovery and outcomes were discussed with the patient. The possibilities of reaction to medication, pulmonary aspiration, injury to surrounding structures,  bleeding, recurrent infection, the need for additional procedures, failure to diagnose a condition, and creating a complication requiring transfusion or operation were discussed with the patient. The patient concurred with the proposed plan, giving informed consent.  The site of surgery was properly noted/marked if necessary per policy. The patient has been actively warmed in preoperative area. Preoperative antibiotics have been ordered and given within 1 hours of incision. Venous thrombosis prophylaxis have been ordered including bilateral sequential compression devices    Procedure Details: The patient was brought to the operating room table.  General anesthesia was induced.  The patient was placed in the dorsolithotomy position.  Her genitalia were prepped and draped.  We introduced a 22 Hebrew sheath per urethra and we drained the bladder.  We then introduced a cystoscope.  Once we entered the bladder we carefully inspected the bladder mucosa.  There was a large stone occupying the floor of the bladder.  There were no tumors identified.  There was some mild inflammatory changes noted along the bladder neck.  At this point we then introduced our 1000 µm laser fiber.  We directed our attention to the stone.  We began our laser lithotripsy.  We used a power setting of 30 W and we were able to fragment the stone.  Using continuous irrigation we fragmented the stone quite nicely.  We obtained our Ellik and irrigated out the stone chips.  We continued our laser ablation until we were satisfied with our stone clearance.  We used our Ellik once again to irrigate out any remaining chips.  We did see a small amount of bleeding along the posterior wall of the bladder.  This was carefully controlled using fulguration.  Once we had good hemostasis we then inspected the bladder mucosa once again.  There was no evidence of any other pathology.  At this point we removed our scope.  At the end we placed a Cooley catheter per  urethra and we drained the bladder.  The patient was awakened and brought to the recovery room in stable condition.  Complications:  None; patient tolerated the procedure well.    Disposition: PACU - hemodynamically stable.  Condition: stable                 Additional Details:     Attending Attestation: I was present and scrubbed for the entire procedure.    Hakan Coles  Phone Number: [phone]

## 2024-11-06 NOTE — ANESTHESIA PREPROCEDURE EVALUATION
Patient: Katerina Abdi    Procedure Information       Date/Time: 11/06/24 1230    Procedure: Cystoscopy with Litholapaxy    Location: [facility] OR 12 / [facility]    Surgeons: Hakan Coles MD            Relevant Problems   Cardiac   (+) Hypertension      /Renal   (+) Kidney stones      ID   (+) Acute upper respiratory infection     Past Medical History:   Diagnosis Date    Fibroid     HYSTERECTOMY    Fractures     Right ankle    Hypertension     Joint pain     Two knee replacements    Urinary tract infection     Occasionally     Past Surgical History:   Procedure Laterality Date    ANKLE FRACTURE SURGERY  ?    CATARACT EXTRACTION  2021    COLONOSCOPY  2022    said more can't be done due to curvature of colon.    HYSTERECTOMY  1997    JOINT REPLACEMENT  2010, 2011    Knee replacements    OTHER SURGICAL HISTORY  2010,2011, ankle?, 1997    knee replacements, right ankle, hysterectomy    TONSILLECTOMY  about 6 years old     Allergies   Allergen Reactions    Cefprozil Unknown         Clinical information reviewed:                   NPO Detail:  No data recorded     Physical Exam    Airway  Mallampati: II  TM distance: >3 FB  Neck ROM: full     Cardiovascular   Rhythm: regular  Rate: normal     Dental    Pulmonary   Breath sounds clear to auscultation     Abdominal            Anesthesia Plan    History of general anesthesia?: yes  History of complications of general anesthesia?: no    ASA 3     general     intravenous induction   Postoperative administration of opioids is intended.  Trial extubation is planned.  Anesthetic plan and risks discussed with patient.  Use of blood products discussed with patient who.

## 2024-11-07 ENCOUNTER — OFFICE VISIT (OUTPATIENT)
Dept: UROLOGY | Facility: CLINIC | Age: 79
End: 2024-11-07
Payer: MEDICARE

## 2024-11-07 VITALS — TEMPERATURE: 98.2 F

## 2024-11-07 DIAGNOSIS — N21.0 CALCULUS, BLADDER: Primary | ICD-10-CM

## 2024-11-07 PROCEDURE — 1126F AMNT PAIN NOTED NONE PRSNT: CPT | Performed by: UROLOGY

## 2024-11-07 PROCEDURE — 1159F MED LIST DOCD IN RCRD: CPT | Performed by: UROLOGY

## 2024-11-07 PROCEDURE — 99213 OFFICE O/P EST LOW 20 MIN: CPT | Performed by: UROLOGY

## 2024-11-07 ASSESSMENT — PAIN SCALES - GENERAL: PAINLEVEL_OUTOF10: 0-NO PAIN

## 2024-11-10 NOTE — PROGRESS NOTES
Patient is a 79 y.o. female presenting with stones    SUBJECTIVE:  HPI   She underwent lithotripsy of a bladder stone. She was discharged with a catheter.      Urine Culture (no units)   Date Value   08/02/2024 20,000 - 80,000 Jose Miguelnczoë pritchett (A)        Past Medical History:   Diagnosis Date    Fibroid     HYSTERECTOMY    Fractures     Right ankle    Hypertension     Joint pain     Two knee replacements    Urinary tract infection     Occasionally      Past Surgical History:   Procedure Laterality Date    ANKLE FRACTURE SURGERY  ?    CATARACT EXTRACTION  2021    COLONOSCOPY  2022    said more can't be done due to curvature of colon.    HYSTERECTOMY  1997    JOINT REPLACEMENT  2010, 2011    Knee replacements    OTHER SURGICAL HISTORY  2010,2011, ankle?, 1997    knee replacements, right ankle, hysterectomy    TONSILLECTOMY  about 6 years old      Family History   Problem Relation Name Age of Onset    Heart disease Father Mike Smith     Hypertension Father Mike Smith       Social History     Socioeconomic History    Marital status:    Tobacco Use    Smoking status: Never    Smokeless tobacco: Never   Vaping Use    Vaping status: Never Used   Substance and Sexual Activity    Alcohol use: Yes     Comment: Glass of wine rarely    Drug use: Never    Sexual activity: Not Currently     Partners: Male     Birth control/protection: None     Comment: Formerly used birth control pills          OBJECTIVE:  Visit Vitals  Temp 36.8 °C (98.2 °F)     Physical Exam   Constitutional: No obvious distress.  Eyes: Non-injected conjunctiva, sclera clear, EOMI.  Ears/Nose/Mouth/Throat: No obvious drainage per ears or nose.  Cardiovascular: Extremities are warm and well perfused. No edema, cyanosis or pallor.  Respiratory: No audible wheezing/stridor; respirations do not appear labored.  Gastrointestinal: Abdomen soft, not distended.  Musculoskeletal: Normal ROM of extremities.  Skin: No obvious rashes or open  "sores.  Neurologic: Alert and oriented, CN 2-12 grossly intact.  Psychiatric: Answers questions appropriately with normal affect.  Hematologic/Lymphatic/Immunologic: No obvious bruises or sites of spontaneous bleeding.  Genitourinary: No CVA tenderness, bladder not palpable.     Labs:  Lab Results   Component Value Date    WBC 7.8 10/30/2024    HGB 12.7 10/30/2024    HCT 38.7 10/30/2024     10/30/2024     10/30/2024    K 4.3 10/30/2024     (H) 10/30/2024    CREATININE 0.76 10/30/2024    BUN 17 10/30/2024    CO2 27 10/30/2024     No results found for: \"KPSAT\", \"KPSAP\"  IMAGING:        PROCEDURES:    ASSESSMENT/PLAN:  Problem List Items Addressed This Visit       Calculus, bladder - Primary        She has a 3 cm bladder stone treated with lithotripsy yesterday.  Her catheter was removed today.      All questions were answered to the patient’s satisfaction.  Patient agrees with the plan and wishes to proceed.  Follow-up will be scheduled appropriately.     Francis Miranda MD  "

## 2025-01-02 NOTE — PROGRESS NOTES
FOLLOW-UP VISIT       HISTORY OF PRESENT ILLNESS:   Katerina Abdi is a 79 y.o. female who presents to me today for follow-up of GSM.     At the patient's last visit, she was prescribed Intrarosa suppositories for GSM management which has worked well.     Patient is s/p cystoscopy with litholapaxy with Dr. Coles on 11/6/24 due to a bladder stone. Patient reports she is doing well since her procedure and is no longer experiencing burning with urination. She is no longer taking Myrbetriq. She is ok with 2 X a night nocturia.    PAST MEDICAL HISTORY:  Past Medical History:   Diagnosis Date    Fibroid     HYSTERECTOMY    Fractures     Right ankle    Hypertension     Joint pain     Two knee replacements    Urinary tract infection     Occasionally       PAST SURGICAL HISTORY:  Past Surgical History:   Procedure Laterality Date    ANKLE FRACTURE SURGERY  ?    CATARACT EXTRACTION  2021    COLONOSCOPY  2022    said more can't be done due to curvature of colon.    HYSTERECTOMY  1997    JOINT REPLACEMENT  2010, 2011    Knee replacements    OTHER SURGICAL HISTORY  2010,2011, ankle?, 1997    knee replacements, right ankle, hysterectomy    TONSILLECTOMY  about 6 years old        ALLERGIES:   Allergies   Allergen Reactions    Cefprozil Unknown    Estradiol Itching    Indocin [Indomethacin] Unknown        MEDICATIONS:   Medication Documentation Review Audit       Reviewed by Poppy Cat MA (Medical Assistant) on 01/03/25 at 1025      Medication Order Taking? Sig Documenting Provider Last Dose Status   ALPRAZolam (Xanax) 0.25 mg tablet [id number] Yes Take 1 tablet (0.25 mg) by mouth every 8 hours if needed. Historical Provider, MD  Active   aspirin 81 mg chewable tablet [id number] Yes Chew 1 tablet (81 mg) once daily. Historical Provider, MD  Active   carvedilol (Coreg) 6.25 mg tablet [id number] Yes Take 1 tablet (6.25 mg) by mouth 2 times a day. Historical Provider, MD  Active   losartan-hydrochlorothiazide (Hyzaar) 100-12.5  mg tablet [id number] Yes Take 1 tablet by mouth once daily in the evening. Historical Provider, MD  Active   multivitamin with iron (Cerovite Jr) chewable tablet [id number] Yes Chew 1 tablet once daily. Historical Provider, MD  Active   Myrbetriq 50 mg tablet extended release 24 hr 24 hr tablet [id number]  Take 1 tablet (50 mg) by mouth once daily.   Patient not taking: Reported on 1/3/2025    Historical Provider, MD  Active   prasterone, dhea, (Intrarosa) 6.5 mg insert [id number] Yes Insert 6.5 mg into the vagina once daily at bedtime. Jeni Falcon MD MPH  Active                     SOCIAL HISTORY:  Patient  reports that she has never smoked. She has never used smokeless tobacco. She reports current alcohol use. She reports that she does not use drugs.   Social History     Socioeconomic History    Marital status:      Spouse name: Not on file    Number of children: Not on file    Years of education: Not on file    Highest education level: Not on file   Occupational History    Not on file   Tobacco Use    Smoking status: Never    Smokeless tobacco: Never   Vaping Use    Vaping status: Never Used   Substance and Sexual Activity    Alcohol use: Yes     Comment: Glass of wine rarely    Drug use: Never    Sexual activity: Not Currently     Partners: Male     Birth control/protection: None     Comment: Formerly used birth control pills   Other Topics Concern    Not on file   Social History Narrative    Not on file     Social Drivers of Health     Financial Resource Strain: Not on file   Food Insecurity: Not on file   Transportation Needs: Not on file   Physical Activity: Not on file   Stress: Not on file   Social Connections: Not on file   Intimate Partner Violence: Not on file   Housing Stability: Not on file       FAMILY HISTORY:  Family History   Problem Relation Name Age of Onset    Heart disease Father Mike Smith     Hypertension Father Mike Smith        REVIEW OF SYSTEMS:  Constitutional: Negative for  "fever and chills. Denies anorexia, weight loss.  Eyes: Negative for visual disturbance.   Respiratory: Negative for shortness of breath.    Cardiovascular: Negative for chest pain.   Gastrointestinal: Negative for nausea and vomiting.   Genitourinary: See interval history above.  Skin: Negative for rash.   Neurological: Negative for dizziness and numbness.   Psychiatric/Behavioral: Negative for confusion and decreased concentration.     PHYSICAL EXAM:  Blood pressure 137/79, pulse 69, height 1.626 m (5' 4\"), weight 68.9 kg (151 lb 12.8 oz).  Constitutional: Patient appears well-developed and well-nourished. No distress.    Head: Normocephalic and atraumatic.    Neck: Normal range of motion.    Pulmonary/Chest: Effort normal. No respiratory distress.   Musculoskeletal: Normal range of motion.    Neurological: Alert and oriented to person, place, and time.  Psychiatric: Normal mood and affect. Behavior is normal. Thought content normal.       Assessment:      Katerina Abdi is a 79 y.o. female with GSM.      Plan:   Prescription for estradiol 0.01% vaginal cream sent to the patient's pharmacy.    Patient will use vaginal estradiol cream 3 times a week.   Follow-up as needed.       Jeni Falcon MD MPH      Scribe Attestation  By signing my name below, IMaddy, Scribe   attest that this documentation has been prepared under the direction and in the presence of Jeni Falcon MD MPH.   "

## 2025-01-03 ENCOUNTER — APPOINTMENT (OUTPATIENT)
Dept: UROLOGY | Facility: CLINIC | Age: 80
End: 2025-01-03
Payer: MEDICARE

## 2025-01-03 VITALS
DIASTOLIC BLOOD PRESSURE: 79 MMHG | WEIGHT: 151.8 LBS | SYSTOLIC BLOOD PRESSURE: 137 MMHG | HEART RATE: 69 BPM | HEIGHT: 64 IN | BODY MASS INDEX: 25.92 KG/M2

## 2025-01-03 DIAGNOSIS — N95.8 GENITOURINARY SYNDROME OF MENOPAUSE: ICD-10-CM

## 2025-01-03 PROCEDURE — 1159F MED LIST DOCD IN RCRD: CPT | Performed by: OBSTETRICS & GYNECOLOGY

## 2025-01-03 PROCEDURE — 3078F DIAST BP <80 MM HG: CPT | Performed by: OBSTETRICS & GYNECOLOGY

## 2025-01-03 PROCEDURE — 3075F SYST BP GE 130 - 139MM HG: CPT | Performed by: OBSTETRICS & GYNECOLOGY

## 2025-01-03 PROCEDURE — 99213 OFFICE O/P EST LOW 20 MIN: CPT | Performed by: OBSTETRICS & GYNECOLOGY

## 2025-01-03 PROCEDURE — G2211 COMPLEX E/M VISIT ADD ON: HCPCS | Performed by: OBSTETRICS & GYNECOLOGY

## 2025-01-03 RX ORDER — ESTRADIOL 0.1 MG/G
CREAM VAGINAL
Qty: 42.5 G | Refills: 3 | Status: SHIPPED | OUTPATIENT
Start: 2025-01-03

## 2025-01-19 ENCOUNTER — OFFICE VISIT (OUTPATIENT)
Dept: URGENT CARE | Age: 80
End: 2025-01-19
Payer: MEDICARE

## 2025-01-19 VITALS
TEMPERATURE: 97.5 F | HEART RATE: 64 BPM | HEIGHT: 65 IN | RESPIRATION RATE: 16 BRPM | SYSTOLIC BLOOD PRESSURE: 137 MMHG | DIASTOLIC BLOOD PRESSURE: 73 MMHG | OXYGEN SATURATION: 98 % | BODY MASS INDEX: 24.83 KG/M2 | WEIGHT: 149 LBS

## 2025-01-19 DIAGNOSIS — R31.9 URINARY TRACT INFECTION WITH HEMATURIA, SITE UNSPECIFIED: ICD-10-CM

## 2025-01-19 DIAGNOSIS — N39.0 URINARY TRACT INFECTION WITH HEMATURIA, SITE UNSPECIFIED: ICD-10-CM

## 2025-01-19 LAB
POC APPEARANCE, URINE: ABNORMAL
POC BILIRUBIN, URINE: NEGATIVE
POC BLOOD, URINE: ABNORMAL
POC COLOR, URINE: ABNORMAL
POC GLUCOSE, URINE: NEGATIVE MG/DL
POC KETONES, URINE: NEGATIVE MG/DL
POC LEUKOCYTES, URINE: ABNORMAL
POC NITRITE,URINE: NEGATIVE
POC PH, URINE: 6 PH
POC PROTEIN, URINE: ABNORMAL MG/DL
POC SPECIFIC GRAVITY, URINE: 1.02
POC UROBILINOGEN, URINE: 0.2 EU/DL

## 2025-01-19 PROCEDURE — 1036F TOBACCO NON-USER: CPT | Performed by: FAMILY MEDICINE

## 2025-01-19 PROCEDURE — 3078F DIAST BP <80 MM HG: CPT | Performed by: FAMILY MEDICINE

## 2025-01-19 PROCEDURE — 3075F SYST BP GE 130 - 139MM HG: CPT | Performed by: FAMILY MEDICINE

## 2025-01-19 PROCEDURE — 1159F MED LIST DOCD IN RCRD: CPT | Performed by: FAMILY MEDICINE

## 2025-01-19 PROCEDURE — 87086 URINE CULTURE/COLONY COUNT: CPT

## 2025-01-19 PROCEDURE — 99203 OFFICE O/P NEW LOW 30 MIN: CPT | Performed by: FAMILY MEDICINE

## 2025-01-19 PROCEDURE — 81003 URINALYSIS AUTO W/O SCOPE: CPT | Performed by: FAMILY MEDICINE

## 2025-01-19 RX ORDER — NITROFURANTOIN 25; 75 MG/1; MG/1
100 CAPSULE ORAL 2 TIMES DAILY
Qty: 14 CAPSULE | Refills: 0 | Status: SHIPPED | OUTPATIENT
Start: 2025-01-19 | End: 2025-01-26

## 2025-01-19 NOTE — PROGRESS NOTES
Subjective   Patient ID: Katerina Abdi is a 79 y.o. female. They present today with a chief complaint of Blood in Urine.    Patient disposition: Home    History of Present Illness  HPI  Hematuria since earlier today.  Patient was able to collect a sample at home.  States 2 months ago she had a urologic procedure to remove kidney stones.  No fever or chills.  No abdominal pain.  No flank pain.  Sees urology and urogynecology, placed on Myrbetriq for OAB.  Otherwise feels well.    Additionally, complaining of left ear fullness for the past several months.  No other complaints or symptoms.          Past Medical History  Allergies as of 01/19/2025 - Reviewed 01/19/2025   Allergen Reaction Noted    Cefprozil Unknown 08/18/2009    Estradiol Itching 01/03/2025    Indocin [indomethacin] Unknown 11/06/2024       (Not in a hospital admission)       Current Outpatient Medications   Medication Sig Dispense Refill    ALPRAZolam (Xanax) 0.25 mg tablet Take 1 tablet (0.25 mg) by mouth every 8 hours if needed.      aspirin 81 mg chewable tablet Chew 1 tablet (81 mg) once daily.      carvedilol (Coreg) 6.25 mg tablet Take 1 tablet (6.25 mg) by mouth 2 times a day.      estradiol (Estrace) 0.01 % (0.1 mg/gram) vaginal cream Insert a pea-sized amount into vagina three times per week at bedtime 42.5 g 3    losartan-hydrochlorothiazide (Hyzaar) 100-12.5 mg tablet Take 1 tablet by mouth once daily in the evening.      multivitamin with iron (Cerovite Jr) chewable tablet Chew 1 tablet once daily.      Myrbetriq 50 mg tablet extended release 24 hr 24 hr tablet Take 1 tablet (50 mg) by mouth once daily. (Patient not taking: Reported on 1/3/2025)      prasterone, dhea, (Intrarosa) 6.5 mg insert Insert 6.5 mg into the vagina once daily at bedtime. 30 each 5     No current facility-administered medications for this visit.       Patient Active Problem List   Diagnosis    Acute upper respiratory infection    Hypertension    Kidney stones     "S/P [id number]    Vaginal atrophy    UTI symptoms    Calculus, bladder       Past Surgical History:   Procedure Laterality Date    ANKLE FRACTURE SURGERY  ?    CATARACT EXTRACTION  2021    COLONOSCOPY  2022    said more can't be done due to curvature of colon.    HYSTERECTOMY  1997    JOINT REPLACEMENT  2010, 2011    Knee replacements    OTHER SURGICAL HISTORY  2010,2011, ankle?, 1997    knee replacements, right ankle, hysterectomy    TONSILLECTOMY  about 6 years old        reports that she has never smoked. She has never used smokeless tobacco. She reports current alcohol use. She reports that she does not use drugs.    Review of Systems  As noted in HPI. ROS otherwise negative unless noted.       Objective    Vitals:    01/19/25 1249   BP: 137/73   BP Location: Right arm   Patient Position: Sitting   Pulse: 64   Resp: 16   Temp: 36.4 °C (97.5 °F)   TempSrc: Oral   SpO2: 98%   Weight: 67.6 kg (149 lb)   Height: 1.651 m (5' 5\")     No LMP recorded. Patient has had a hysterectomy.    Physical Exam  Constitutional: vital signs reviewed. Well developed, well nourished. patient alert and patient without distress.   Head and Face: Normal and atraumatic.      Cardiovascular: Heart rate normal, normal S1 and S2, no gallops, no murmurs and no pericardial rub. Rhythm: Normal.  Pulmonary: No respiratory distress. Palpation of chest: Normal. Clear bilateral breath sounds.   Abdomen: Soft nontender; no abdominal mass palpated. No organomegaly.  Negative flank tenderness on percussion  Skin: Normal skin color and pigmentation, normal skin turgor, and no rash.  ENT: Normal oral mucosa, posterior pharynx.  Moist.  Left TM normal, minimal cerumen in entrance of canal.      Procedures    Point of Care Test & Imaging Results from this visit  Results for orders placed or performed in visit on 01/19/25   POCT UA Automated manually resulted    Collection Time: 01/19/25 12:54 PM   Result Value Ref Range    POC Color, Urine Luz (A) Straw, " Yellow, Light-Yellow    POC Appearance, Urine Cloudy (A) Clear    POC Glucose, Urine NEGATIVE NEGATIVE mg/dl    POC Bilirubin, Urine NEGATIVE NEGATIVE    POC Ketones, Urine NEGATIVE NEGATIVE mg/dl    POC Specific Gravity, Urine 1.020 1.005 - 1.035    POC Blood, Urine LARGE (3+) (A) NEGATIVE    POC PH, Urine 6.0 No Reference Range Established PH    POC Protein, Urine 15 (1+) (A) NEGATIVE mg/dl    POC Urobilinogen, Urine 0.2 0.2, 1.0 EU/DL    Poc Nitrite, Urine NEGATIVE NEGATIVE    POC Leukocytes, Urine SMALL (1+) (A) NEGATIVE            Diagnostic study results (if any) were reviewed.    Assessment/Plan   Allergies, medications, history, and pertinent labs/EKGs/Imaging reviewed.    Medical Decision Making  See note    Orders and Diagnoses  Diagnoses and all orders for this visit:  Urinary tract infection with hematuria, site unspecified  -     POCT UA Automated manually resulted      Medical Admin Record      Follow Up Instructions  No follow-ups on file.    [At time of discharge patient was clinically well-appearing and HDS for outpatient management. The patient and/or family was educated regarding diagnosis, supportive care, OTC and Rx medications. The patient and/or family was given the opportunity to ask questions prior to discharge and all questions answered. They verbalized understanding of my discussion of the plans for treatment, expected course, indications to return to  or seek further evaluation in ED, and the need for timely follow up as directed. ]      Electronically signed by [facility]

## 2025-01-19 NOTE — PATIENT INSTRUCTIONS
You have an infection of your urinary tract.  You will be started on antibiotic which will be sent to the pharmacy; please complete the regimen as directed even if your symptoms improve. It is recommended to take an Probiotic while on this medication to reduce symptoms of upset stomach, diarrhea, and in women, yeast infections.   If you started experiencing fevers, back pain, chills, return to the office or follow-up with your PCP sooner.  Increase your hydration with water, or cranberry juice, drinking 6-8 glasses of water per day.  Do not hold your urine, when you get the urge to urinate, void.  Additionally, you can take over-the-counter medication to help with the burning sensation: Pyridium or phenazopyridine (Azo). This may change your urine color orange.     A urine culture will be sent, if there is a need to change your antibiotic after the results, you will be notified      Decongestant such as Zyrtec to help with postnasal drainage and ear congestion.

## 2025-01-21 LAB — BACTERIA UR CULT: NORMAL

## 2025-01-22 ENCOUNTER — OFFICE VISIT (OUTPATIENT)
Dept: URGENT CARE | Age: 80
End: 2025-01-22
Payer: MEDICARE

## 2025-01-22 VITALS
SYSTOLIC BLOOD PRESSURE: 175 MMHG | DIASTOLIC BLOOD PRESSURE: 85 MMHG | BODY MASS INDEX: 25.16 KG/M2 | TEMPERATURE: 97.6 F | OXYGEN SATURATION: 98 % | HEIGHT: 65 IN | WEIGHT: 151 LBS | RESPIRATION RATE: 19 BRPM | HEART RATE: 66 BPM

## 2025-01-22 DIAGNOSIS — R31.9 HEMATURIA, UNSPECIFIED TYPE: Primary | ICD-10-CM

## 2025-01-22 DIAGNOSIS — R39.15 URGENCY OF URINATION: ICD-10-CM

## 2025-01-22 PROCEDURE — 81003 URINALYSIS AUTO W/O SCOPE: CPT | Performed by: FAMILY MEDICINE

## 2025-01-22 PROCEDURE — 1159F MED LIST DOCD IN RCRD: CPT | Performed by: FAMILY MEDICINE

## 2025-01-22 PROCEDURE — 87086 URINE CULTURE/COLONY COUNT: CPT

## 2025-01-22 PROCEDURE — 3079F DIAST BP 80-89 MM HG: CPT | Performed by: FAMILY MEDICINE

## 2025-01-22 PROCEDURE — 3077F SYST BP >= 140 MM HG: CPT | Performed by: FAMILY MEDICINE

## 2025-01-22 PROCEDURE — 99499 UNLISTED E&M SERVICE: CPT | Performed by: FAMILY MEDICINE

## 2025-01-23 ENCOUNTER — OFFICE VISIT (OUTPATIENT)
Dept: UROLOGY | Facility: CLINIC | Age: 80
End: 2025-01-23
Payer: MEDICARE

## 2025-01-23 VITALS
SYSTOLIC BLOOD PRESSURE: 145 MMHG | BODY MASS INDEX: 25.81 KG/M2 | HEART RATE: 74 BPM | TEMPERATURE: 97.2 F | WEIGHT: 151.2 LBS | HEIGHT: 64 IN | DIASTOLIC BLOOD PRESSURE: 81 MMHG

## 2025-01-23 DIAGNOSIS — N20.0 NEPHROLITHIASIS: Primary | ICD-10-CM

## 2025-01-23 DIAGNOSIS — N20.0 KIDNEY STONES: Primary | ICD-10-CM

## 2025-01-23 PROCEDURE — 3077F SYST BP >= 140 MM HG: CPT | Performed by: OBSTETRICS & GYNECOLOGY

## 2025-01-23 PROCEDURE — 3079F DIAST BP 80-89 MM HG: CPT | Performed by: OBSTETRICS & GYNECOLOGY

## 2025-01-23 PROCEDURE — 1036F TOBACCO NON-USER: CPT | Performed by: OBSTETRICS & GYNECOLOGY

## 2025-01-23 PROCEDURE — G2211 COMPLEX E/M VISIT ADD ON: HCPCS | Performed by: OBSTETRICS & GYNECOLOGY

## 2025-01-23 PROCEDURE — 1159F MED LIST DOCD IN RCRD: CPT | Performed by: OBSTETRICS & GYNECOLOGY

## 2025-01-23 PROCEDURE — 1126F AMNT PAIN NOTED NONE PRSNT: CPT | Performed by: OBSTETRICS & GYNECOLOGY

## 2025-01-23 PROCEDURE — 99213 OFFICE O/P EST LOW 20 MIN: CPT | Performed by: OBSTETRICS & GYNECOLOGY

## 2025-01-23 ASSESSMENT — PAIN SCALES - GENERAL: PAINLEVEL_OUTOF10: 0-NO PAIN

## 2025-01-23 NOTE — PROGRESS NOTES
"Subjective   Patient ID: Katerina Abdi is a 79 y.o. female. They present today with a chief complaint of revisit (Was here 1/19 for a UTI.  Was given Nitrofurantoin and pt feels it is not working.  She states it feels like it is making her \"go\" more often and has noticed she is spotting again.).    History of Present Illness  HPI  Days of frequent uncomfortable urination. No blood noted in urine or stool. No abdominal pains. No back pain. No vaginal discharge or rash. No nausea, vomiting or diarrhea. No known exposures to STDs. Denies fevers or chills. No medications taken yet for symptoms.  Patient was seen here on 1/19/2025 and diagnosed with a UTI.  Culture subsequently showed no significant or pathological growth.      Past Medical History  Allergies as of 01/22/2025 - Reviewed 01/22/2025   Allergen Reaction Noted    Cefprozil Unknown 08/18/2009    Estradiol Itching 01/03/2025    Indocin [indomethacin] Unknown 11/06/2024       (Not in a hospital admission)       Past Medical History:   Diagnosis Date    Fibroid     HYSTERECTOMY    Fractures     Right ankle    Hypertension     Joint pain     Two knee replacements    Urinary tract infection     Occasionally       Past Surgical History:   Procedure Laterality Date    ANKLE FRACTURE SURGERY  ?    CATARACT EXTRACTION  2021    COLONOSCOPY  2022    said more can't be done due to curvature of colon.    HYSTERECTOMY  1997    JOINT REPLACEMENT  2010, 2011    Knee replacements    OTHER SURGICAL HISTORY  2010,2011, ankle?, 1997    knee replacements, right ankle, hysterectomy    TONSILLECTOMY  about 6 years old        reports that she has never smoked. She has never used smokeless tobacco. She reports current alcohol use. She reports that she does not use drugs.    Review of Systems  Review of Systems       As in history of present                        Objective    Vitals:    01/22/25 1853   BP: 175/85   Pulse: 66   Resp: 19   Temp: 36.4 °C (97.6 °F)   TempSrc: Oral " "  SpO2: 98%   Weight: 68.5 kg (151 lb)   Height: 1.651 m (5' 5\")     No LMP recorded. Patient has had a hysterectomy.    Physical Exam  Alert and oriented, no apparent distress  Abdomen soft, nontender, nondistended.  No CVA tenderness  Skin shows no rashes sores or lesions   exam not indicated at this time  Procedures    Point of Care Test & Imaging Results from this visit  Results for orders placed or performed in visit on 01/22/25   POCT UA Automated manually resulted   Result Value Ref Range    POC Color, Urine Red-brown (A) Straw, Yellow, Light-Yellow    POC Appearance, Urine Hazy (A) Clear    POC Glucose, Urine NEGATIVE NEGATIVE mg/dl    POC Bilirubin, Urine NEGATIVE NEGATIVE    POC Ketones, Urine NEGATIVE NEGATIVE mg/dl    POC Specific Gravity, Urine 1.020 1.005 - 1.035    POC Blood, Urine LARGE (3+) (A) NEGATIVE    POC PH, Urine 6.0 No Reference Range Established PH    POC Protein, Urine 100 (2+) (A) NEGATIVE mg/dl    POC Urobilinogen, Urine 0.2 0.2, 1.0 EU/DL    Poc Nitrite, Urine NEGATIVE NEGATIVE    POC Leukocytes, Urine SMALL (1+) (A) NEGATIVE      No results found.    Diagnostic study results (if any) were reviewed by Karan Patton MD.    Assessment/Plan   Allergies, medications, history, and pertinent labs/EKGs/Imaging reviewed by Karan Patton MD.     Medical Decision Making  At time of discharge patient was clinically well-appearing and HDS for outpatient management. The patient and/or family was educated regarding diagnosis, supportive care, OTC and Rx medications. The patient and/or family was given the opportunity to ask questions prior to discharge.  They verbalized understanding of my discussion of the plans for treatment, expected course, indications to return to  or seek further evaluation in ED, and the need for timely follow up as directed.   They were provided with a work/school excuse if requested.    Orders and Diagnoses  Diagnoses and all orders for this visit:  Hematuria, " unspecified type  -     Urine Culture  Urgency of urination  -     POCT UA Automated manually resulted  -     Urine Culture      Medical Admin Record      Patient disposition: Home    Electronically signed by Karan Patton MD  7:20 PM

## 2025-01-23 NOTE — PATIENT INSTRUCTIONS
Previous culture showed no significant bacterial growth  You may discontinue the nitrofurantoin  Increase fluid intake  Follow-up with your urologist, Dr. Falcon, as discussed in office  Go to ER for fevers or abdominal pain

## 2025-01-23 NOTE — PROGRESS NOTES
FOLLOW-UP VISIT       HISTORY OF PRESENT ILLNESS:   Katerina Abdi is a 79 y.o. female who presents to me today for follow-up of UTI. Patient started to experience UTI symptoms including hematuria, frequency, urgency, and dysuria since 1/19/25. Denies abdominal or back pain. UA demonstrated evidence of UTI. Was started on Macrobid which caused more frequency. She then revisited urgent care 1/22/25 where they discussed her urine culture from the previous visit subsequently showed no significant or pathological growth. She was then told to discontinue Macrobid.     Patient is s/p cystoscopy with litholapaxy with Dr. Coles on 11/6/24 due to a bladder stone.     PAST MEDICAL HISTORY:  Past Medical History:   Diagnosis Date    Fibroid     HYSTERECTOMY    Fractures     Right ankle    Hypertension     Joint pain     Two knee replacements    Urinary tract infection     Occasionally       PAST SURGICAL HISTORY:  Past Surgical History:   Procedure Laterality Date    ANKLE FRACTURE SURGERY  ?    CATARACT EXTRACTION  2021    COLONOSCOPY  2022    said more can't be done due to curvature of colon.    HYSTERECTOMY  1997    JOINT REPLACEMENT  2010, 2011    Knee replacements    OTHER SURGICAL HISTORY  2010,2011, ankle?, 1997    knee replacements, right ankle, hysterectomy    TONSILLECTOMY  about 6 years old        ALLERGIES:   Allergies   Allergen Reactions    Cefprozil Unknown    Estradiol Itching    Indocin [Indomethacin] Unknown        MEDICATIONS:   Medication Documentation Review Audit       Reviewed by Anh Vera MA (Medical Assistant) on 01/23/25 at 1502      Medication Order Taking? Sig Documenting Provider Last Dose Status   ALPRAZolam (Xanax) 0.25 mg tablet [id number] No Take 1 tablet (0.25 mg) by mouth every 8 hours if needed. Historical Provider, MD Past Week Active   aspirin 81 mg chewable tablet [id number]  Chew 1 tablet (81 mg) once daily. Historical Provider, MD  Active   carvedilol (Coreg) 6.25 mg tablet  [id number] No Take 1 tablet (6.25 mg) by mouth 2 times a day. Sandra Grijalva MD 11/6/2024 Active   estradiol (Estrace) 0.01 % (0.1 mg/gram) vaginal cream [id number]  Insert a pea-sized amount into vagina three times per week at bedtime Jeni Falcon MD MPH  Active   losartan-hydrochlorothiazide (Hyzaar) 100-12.5 mg tablet [id number] No Take 1 tablet by mouth once daily in the evening. Sandra Grijalva MD 11/5/2024 Active   multivitamin with iron (Cerovite Jr) chewable tablet [id number] No Chew 1 tablet once daily. Sandra Grijalva MD Past Week Active   Myrbetriq 50 mg tablet extended release 24 hr 24 hr tablet [id number] No Take 1 tablet (50 mg) by mouth once daily.   Patient not taking: Reported on 1/3/2025    Historical Provider, MD Past Week Active   nitrofurantoin, macrocrystal-monohydrate, (Macrobid) 100 mg capsule [id number]  Take 1 capsule (100 mg) by mouth 2 times a day for 7 days. Mariely Cui DO  Active   prasterone, dhea, ([place]) 6.5 mg insert [id number] No Insert 6.5 mg into the vagina once daily at bedtime. Jeni Falcon MD MPH Past Week Active                     SOCIAL HISTORY:  Patient  reports that she has never smoked. She has never used smokeless tobacco. She reports current alcohol use. She reports that she does not use drugs.   Social History     Socioeconomic History    Marital status:      Spouse name: Not on file    Number of children: Not on file    Years of education: Not on file    Highest education level: Not on file   Occupational History    Not on file   Tobacco Use    Smoking status: Never    Smokeless tobacco: Never   Vaping Use    Vaping status: Never Used   Substance and Sexual Activity    Alcohol use: Yes     Comment: Glass of wine rarely    Drug use: Never    Sexual activity: Not Currently     Partners: Male     Birth control/protection: None     Comment: Formerly used birth control pills   Other Topics Concern    Not on file   Social History Narrative     "Not on file     Social Drivers of Health     Financial Resource Strain: Not on file   Food Insecurity: Not on file   Transportation Needs: Not on file   Physical Activity: Not on file   Stress: Not on file   Social Connections: Not on file   Intimate Partner Violence: Not on file   Housing Stability: Not on file       FAMILY HISTORY:  Family History   Problem Relation Name Age of Onset    Heart disease Father Mike Smith     Hypertension Father Mike Smith        REVIEW OF SYSTEMS:  Constitutional: Negative for fever and chills. Denies anorexia, weight loss.  Eyes: Negative for visual disturbance.   Respiratory: Negative for shortness of breath.    Cardiovascular: Negative for chest pain.   Gastrointestinal: Negative for nausea and vomiting.   Genitourinary: See interval history above.  Skin: Negative for rash.   Neurological: Negative for dizziness and numbness.   Psychiatric/Behavioral: Negative for confusion and decreased concentration.     PHYSICAL EXAM:  Blood pressure 145/81, pulse 74, temperature 36.2 °C (97.2 °F), height 1.626 m (5' 4\"), weight 68.6 kg (151 lb 3.2 oz).  Constitutional: Patient appears well-developed and well-nourished. No distress.    Head: Normocephalic and atraumatic.    Neck: Normal range of motion.    Cardiovascular: Normal rate.    Pulmonary/Chest: Effort normal. No respiratory distress.   Musculoskeletal: Normal range of motion.    Neurological: Alert and oriented to person, place, and time.  Psychiatric: Normal mood and affect. Behavior is normal. Thought content normal.      LABORATORY REVIEW:   Component      Latest Ref Rng 1/19/2025 1/22/2025   POC Color, Urine      Straw, Yellow, Light-Yellow  Luz !  Red-brown !    POC Appearance, Urine      Clear  Cloudy !  Hazy !    POC Glucose, Urine      NEGATIVE mg/dl NEGATIVE  NEGATIVE    POC Bilirubin, Urine      NEGATIVE  NEGATIVE  NEGATIVE    POC Ketones, Urine      NEGATIVE mg/dl NEGATIVE  NEGATIVE    POC Specific [place], Urine    "   1.005 - 1.035  1.020  1.020    POC Blood, Urine      NEGATIVE  LARGE (3+) !  LARGE (3+) !    POC PH, Urine      No Reference Range Established PH 6.0  6.0    POC Protein, Urine      NEGATIVE mg/dl 15 (1+) !  100 (2+) !    POC Urobilinogen, Urine      0.2, 1.0 EU/DL 0.2  0.2    Poc Nitrite, Urine      NEGATIVE  NEGATIVE  NEGATIVE    POC Leukocytes, Urine      NEGATIVE  SMALL (1+) !  SMALL (1+) !       Legend:  ! Abnormal     Assessment:      Katerina Abdi is a 79 y.o. female with nephrolithiasis     Plan:   Order CT stone protocol or US/KUB per Dr. Miranda's preference  Continue Macrobid treatment started at urgent care.   Follow-up with Dr. Miranda after imaging.     Jeni Falcon MD MPH      Scribe Attestation  By signing my name below, I, Maddy Borrero, Scribe   attest that this documentation has been prepared under the direction and in the presence of Jeni Falcon MD MPH.    3 (mild pain)

## 2025-01-24 LAB — BACTERIA UR CULT: NO GROWTH

## 2025-02-12 ENCOUNTER — HOSPITAL ENCOUNTER (OUTPATIENT)
Dept: RADIOLOGY | Facility: HOSPITAL | Age: 80
Discharge: HOME | End: 2025-02-12
Payer: MEDICARE

## 2025-02-12 DIAGNOSIS — N20.0 KIDNEY STONES: ICD-10-CM

## 2025-02-12 PROCEDURE — 74018 RADEX ABDOMEN 1 VIEW: CPT

## 2025-02-12 NOTE — PROGRESS NOTES
"  Patient is a 79 y.o. female presenting with bladder stones, and kidney stones.    SUBJECTIVE:  HPI   She reports a mild improvement to her frequency.   She denies hematuria since 1/25  She was receiving daily prophylaxis with nitrofurantoin for her history of urinary tract infection, which did not improve her frequency of UTI.  She did have an improvement with Cipro for 5 days.   She mentions she was being treated for vaginal atrophy with prasterone.   She held the suppository while under going treatment for her bladder stone and wonders if this could be the cause of her UTI's and bladder stone.  Patient is s/p cystoscopy with litholapaxy with Dr. Coles on 11/6/24. She was discharged with a catheter which was removed on 11/07/24.    The patient's past medical, surgical, and family histories were reviewed appropriately.    OBJECTIVE:  There were no vitals taken for this visit.    LABS:  Lab Results   Component Value Date    GLUCOSE 86 10/30/2024    CALCIUM 9.4 10/30/2024     10/30/2024    K 4.3 10/30/2024    CO2 27 10/30/2024     (H) 10/30/2024    BUN 17 10/30/2024    CREATININE 0.76 10/30/2024       No results found for: \"URICACID\"  No results found for: \"PTH\"    Urine Culture (no units)   Date Value   01/22/2025 No growth      IMAGING:  KUB 2/12/25   viewed     PROCEDURES:  PVR 9 mL  2/13/25    ASSESSMENT/PLAN:  Problem List Items Addressed This Visit       Kidney stones    Relevant Orders    Basic Metabolic Panel    Parathyroid Hormone, Intact    Uric Acid    XR abdomen 1 view    Calculus, bladder - Primary     Other Visit Diagnoses       Urinary symptom or sign        Relevant Orders    POCT UA Automated manually resulted (Completed)    Measure post void residual (Completed)    Abnormal finding on urinalysis        Relevant Orders    Urine Culture    Microscopic Only, Urine           She has history of a 3 cm bladder stone treated with litholapaxy with Dr. Coles on 11/6/24.  Her catheter was " removed 11/07/24. Her KUB 2/12/25 identified bilateral small kidney stones up to 3-4 mm stone on the right side. We discussed that there is a 70% chance of passing a stone of this size without intervention. She will complete a 24-hour urine. Stone labs will be ordered. She will follow up in 4 months with KUB.    She has history of recurrent urinary tract infections. UA had small blood and trace leukocytes today 2/13/25 and will be sent for microscopy, culture, and sensitivity.     All questions were answered to the patient’s satisfaction.  Patient agrees with the plan and wishes to proceed.  Follow-up will be scheduled appropriately.     Scribe Attestation  By signing my name below, I, Jonas Garza,   attest that this documentation has been prepared under the direction and in the presence of Francis Miranda MD.

## 2025-02-13 ENCOUNTER — APPOINTMENT (OUTPATIENT)
Dept: UROLOGY | Facility: CLINIC | Age: 80
End: 2025-02-13
Payer: MEDICARE

## 2025-02-13 DIAGNOSIS — N21.0 CALCULUS, BLADDER: Primary | ICD-10-CM

## 2025-02-13 DIAGNOSIS — R31.29 MICROSCOPIC HEMATURIA: ICD-10-CM

## 2025-02-13 DIAGNOSIS — R82.90 ABNORMAL FINDING ON URINALYSIS: ICD-10-CM

## 2025-02-13 DIAGNOSIS — N20.0 KIDNEY STONES: ICD-10-CM

## 2025-02-13 DIAGNOSIS — R39.9 URINARY SYMPTOM OR SIGN: ICD-10-CM

## 2025-02-13 LAB
POC APPEARANCE, URINE: CLEAR
POC BILIRUBIN, URINE: NEGATIVE
POC BLOOD, URINE: ABNORMAL
POC COLOR, URINE: YELLOW
POC GLUCOSE, URINE: NEGATIVE MG/DL
POC KETONES, URINE: NEGATIVE MG/DL
POC LEUKOCYTES, URINE: ABNORMAL
POC NITRITE,URINE: NEGATIVE
POC PH, URINE: 6 PH
POC PROTEIN, URINE: NEGATIVE MG/DL
POC SPECIFIC GRAVITY, URINE: 1.02
POC UROBILINOGEN, URINE: 0.2 EU/DL

## 2025-02-13 PROCEDURE — 1126F AMNT PAIN NOTED NONE PRSNT: CPT | Performed by: UROLOGY

## 2025-02-13 PROCEDURE — 99214 OFFICE O/P EST MOD 30 MIN: CPT | Performed by: UROLOGY

## 2025-02-13 PROCEDURE — 1159F MED LIST DOCD IN RCRD: CPT | Performed by: UROLOGY

## 2025-02-13 PROCEDURE — G2211 COMPLEX E/M VISIT ADD ON: HCPCS | Performed by: UROLOGY

## 2025-02-13 PROCEDURE — 81003 URINALYSIS AUTO W/O SCOPE: CPT | Performed by: UROLOGY

## 2025-02-13 ASSESSMENT — PAIN SCALES - GENERAL: PAINLEVEL_OUTOF10: 0-NO PAIN

## 2025-02-13 NOTE — PATIENT INSTRUCTIONS
Your provider ordered an xray, called a KUB, you may walk into any  Radiology Facility to have this performed or you may schedule at [phone]    Your provider has ordered blood work to be drawn.     Your provider has ordered a 24 hour urine test.  This kit will be mailed to your house by [facility] through [facility].

## 2025-02-14 LAB
ANION GAP SERPL CALCULATED.4IONS-SCNC: 12 MMOL/L (CALC) (ref 7–17)
BUN SERPL-MCNC: 15 MG/DL (ref 7–25)
BUN/CREAT SERPL: NORMAL (CALC) (ref 6–22)
CALCIUM SERPL-MCNC: 9.4 MG/DL (ref 8.6–10.4)
CHLORIDE SERPL-SCNC: 106 MMOL/L (ref 98–110)
CO2 SERPL-SCNC: 24 MMOL/L (ref 20–32)
CREAT SERPL-MCNC: 0.77 MG/DL (ref 0.6–1)
EGFRCR SERPLBLD CKD-EPI 2021: 79 ML/MIN/1.73M2
GLUCOSE SERPL-MCNC: 82 MG/DL (ref 65–139)
POTASSIUM SERPL-SCNC: 4.3 MMOL/L (ref 3.5–5.3)
PTH-INTACT SERPL-MCNC: 36 PG/ML (ref 16–77)
SODIUM SERPL-SCNC: 142 MMOL/L (ref 135–146)
URATE SERPL-MCNC: 5 MG/DL (ref 2.5–7)

## 2025-02-15 LAB
BACTERIA #/AREA URNS HPF: ABNORMAL /HPF
BACTERIA UR CULT: NORMAL
HYALINE CASTS #/AREA URNS LPF: ABNORMAL /LPF
RBC #/AREA URNS HPF: ABNORMAL /HPF
SERVICE CMNT-IMP: ABNORMAL
SQUAMOUS #/AREA URNS HPF: ABNORMAL /HPF
WBC #/AREA URNS HPF: ABNORMAL /HPF

## 2025-02-19 PROBLEM — R31.29 MICROSCOPIC HEMATURIA: Status: ACTIVE | Noted: 2025-02-19

## 2025-03-13 ENCOUNTER — APPOINTMENT (OUTPATIENT)
Dept: UROLOGY | Facility: CLINIC | Age: 80
End: 2025-03-13
Payer: MEDICARE

## 2025-03-28 ENCOUNTER — OFFICE VISIT (OUTPATIENT)
Dept: URGENT CARE | Age: 80
End: 2025-03-28
Payer: MEDICARE

## 2025-03-28 VITALS
HEART RATE: 67 BPM | TEMPERATURE: 97.8 F | WEIGHT: 151 LBS | OXYGEN SATURATION: 97 % | DIASTOLIC BLOOD PRESSURE: 88 MMHG | BODY MASS INDEX: 25.92 KG/M2 | RESPIRATION RATE: 16 BRPM | SYSTOLIC BLOOD PRESSURE: 130 MMHG

## 2025-03-28 DIAGNOSIS — H61.22 IMPACTED CERUMEN OF LEFT EAR: ICD-10-CM

## 2025-03-28 DIAGNOSIS — B35.1 ONYCHOMYCOSIS OF LEFT GREAT TOE: Primary | ICD-10-CM

## 2025-03-28 PROCEDURE — 3079F DIAST BP 80-89 MM HG: CPT

## 2025-03-28 PROCEDURE — 1159F MED LIST DOCD IN RCRD: CPT

## 2025-03-28 PROCEDURE — 1160F RVW MEDS BY RX/DR IN RCRD: CPT

## 2025-03-28 PROCEDURE — 3075F SYST BP GE 130 - 139MM HG: CPT

## 2025-03-28 PROCEDURE — 99213 OFFICE O/P EST LOW 20 MIN: CPT

## 2025-03-28 NOTE — PROGRESS NOTES
Subjective   Patient ID: Katerina Abdi is a 79 y.o. female. They present today with a chief complaint of toe nail issue  (Painful toe b/l).    History of Present Illness    History provided by:  Patient   used: No        Past Medical History  Allergies as of 03/28/2025 - Reviewed 03/28/2025   Allergen Reaction Noted   • Cefprozil Unknown 08/18/2009   • Estradiol Itching 01/03/2025   • Indocin [indomethacin] Unknown 11/06/2024       (Not in a hospital admission)       Past Medical History:   Diagnosis Date   • Fibroid     HYSTERECTOMY   • Fractures     Right ankle   • Hypertension    • Joint pain     Two knee replacements   • Urinary tract infection     Occasionally       Past Surgical History:   Procedure Laterality Date   • ANKLE FRACTURE SURGERY  ?   • CATARACT EXTRACTION  2021   • COLONOSCOPY  2022    said more can't be done due to curvature of colon.   • HYSTERECTOMY  1997   • JOINT REPLACEMENT  2010, 2011    Knee replacements   • OTHER SURGICAL HISTORY  2010,2011, ankle?, 1997    knee replacements, right ankle, hysterectomy   • TONSILLECTOMY  about 6 years old        reports that she has never smoked. She has never used smokeless tobacco. She reports current alcohol use. She reports that she does not use drugs.    Review of Systems  Review of Systems                               Objective    Vitals:    03/28/25 1920   BP: 130/88   Pulse: 67   Resp: 16   Temp: 36.6 °C (97.8 °F)   SpO2: 97%   Weight: 68.5 kg (151 lb)     No LMP recorded. Patient has had a hysterectomy.    Physical Exam    Procedures    Point of Care Test & Imaging Results from this visit  No results found for this visit on 03/28/25.   Imaging  No results found.    Cardiology, Vascular, and Other Imaging  No other imaging results found for the past 2 days      Diagnostic study results (if any) were reviewed by GLENIS Dillard.    Assessment/Plan   Allergies, medications, history, and pertinent labs/EKGs/Imaging  reviewed by GLENIS Dillard.     Medical Decision Making  ***    Orders and Diagnoses  Diagnoses and all orders for this visit:  Onychomycosis of left great toe  -     efinaconazole 10 % solution with applicator; Apply 1 Application topically once daily for 14 days.      Medical Admin Record      Patient disposition: { Disposition:[id number]}    Electronically signed by GLENIS Dillard  7:59 PM       to patient or proxy's satisfaction: yes      Patient identity confirmed:  Verbally with patient  Procedure details:     Location:  L ear    Procedure type: irrigation      Procedure outcomes: unable to remove cerumen    Post-procedure details:     Inspection:  No bleeding and some cerumen remaining    Procedure completion:  Procedure terminated at patient's request  Comments:      Patient reported pain and was unable to continue procedure.       Point of Care Test & Imaging Results from this visit  No results found for this visit on 03/28/25.   Imaging  No results found.    Cardiology, Vascular, and Other Imaging  No other imaging results found for the past 2 days      Diagnostic study results (if any) were reviewed by GLENIS Dillard.    Assessment/Plan   Allergies, medications, history, and pertinent labs/EKGs/Imaging reviewed by GLENIS Dillard.     Medical Decision Making    Topical antifungal prescribed.  Oral antifungal is not appropriate for patient. Encouraged she schedule with her PCP for follow-up and monitoring.  Cerumen was not removed as documented in procedure note.     Orders and Diagnoses  Diagnoses and all orders for this visit:  Onychomycosis of left great toe  -     efinaconazole 10 % solution with applicator; Apply 1 Application topically once daily for 14 days.      Medical Admin Record      Patient disposition: Home    Electronically signed by GLENIS Dillard  7:59 PM

## 2025-03-31 PROCEDURE — 69209 REMOVE IMPACTED EAR WAX UNI: CPT

## 2025-05-12 ENCOUNTER — HOSPITAL ENCOUNTER (OUTPATIENT)
Dept: RADIOLOGY | Facility: CLINIC | Age: 80
Discharge: HOME | End: 2025-05-12
Payer: MEDICARE

## 2025-05-12 DIAGNOSIS — N20.0 KIDNEY STONES: ICD-10-CM

## 2025-05-12 PROCEDURE — 74018 RADEX ABDOMEN 1 VIEW: CPT | Performed by: RADIOLOGY

## 2025-05-12 PROCEDURE — 74018 RADEX ABDOMEN 1 VIEW: CPT

## 2025-06-04 NOTE — PROGRESS NOTES
Patient is a 79 y.o. female presenting for followup with [facility] of bladder stones, and kidney stones.    SUBJECTIVE:  HPI   She presents for 4-month followup.  She has not had any recent stone symptoms.    She has taken daily prophylaxis with nitrofurantoin for UTI's in the past and has been treated for vaginal atrophy with prasterone.  Patient is s/p cystoscopy with litholapaxy with Dr. Coles on 11/6/24.    Medical History[1]  Surgical History[2]  Family History[3]  Social History[4]    Review of Systems  All systems have been reviewed and are negative unless otherwise noted in the HPI.    OBJECTIVE:  There were no vitals taken for this visit.  Physical Exam  Constitutional: No obvious distress.  Eyes: Non-injected conjunctiva, sclera clear, EOMI.  Ears/Nose/Mouth/Throat: No obvious drainage per ears or nose.  Cardiovascular: Extremities are warm and well perfused. No edema, cyanosis or pallor.  Respiratory: No audible wheezing/stridor; respirations do not appear labored.  Gastrointestinal: Abdomen soft, not distended.  Musculoskeletal: Normal ROM of extremities.  Skin: No obvious rashes or open sores.  Neurologic: Alert and oriented, CN 2-12 grossly intact.  Psychiatric: Answers questions appropriately with normal affect.  Hematologic/Lymphatic/Immunologic: No obvious bruises or sites of spontaneous bleeding.  Genitourinary: No CVA tenderness, bladder not palpable.     LABS:  Lab Results   Component Value Date    WBC 7.8 10/30/2024    HGB 12.7 10/30/2024    HCT 38.7 10/30/2024    MCV 95 10/30/2024     10/30/2024    GLUCOSE 82 02/13/2025    CALCIUM 9.4 02/13/2025     02/13/2025    K 4.3 02/13/2025    CO2 24 02/13/2025     02/13/2025    BUN 15 02/13/2025    CREATININE 0.77 02/13/2025    EGFR 79 02/13/2025    PTH 36 02/13/2025    URICACID 5.0 02/13/2025    URINECULTURE SEE NOTE 02/13/2025   [facility] 24-hour urinalysis: 2/26/25  Cystine screening: negative (2/27/25)    IMAGING:  === 05/12/25  ===  XR ABDOMEN 1 VIEW  - Impression -  1.  No radiopaque renal or ureteral calculi seen    PROCEDURES:  PVR 9 mL  2/13/25    ASSESSMENT/PLAN:  Problem List Items Addressed This Visit       Kidney stones - Primary     She has history of a 3 cm bladder stone treated with litholapaxy with Dr. Coles on 11/6/24.  KUB 2/12/25 identified bilateral small kidney stones up to 3-4 mm stone on the right side. KUB from 5/25 was viewed and appeared negative.     For stone prevention, PTH and uric acid were normal in 2/25. [facility] 24-hour urinalysis from 2/25 was reviewed and identified a volume of 770 mL, citrate is on the low end, and oxalate level was slightly elevated.    She was advised to increase fluid intake to a goal urine output of 2.5 L per day, and add lemon juice 4 ounces daily, diluted. She was advised to avoid excessive dietary oxalates. List of high oxalate foods provided.    She has history of recurrent urinary tract infections.     RTC in 6 months.     All questions were answered to the patient’s satisfaction.  Patient agrees with the plan and wishes to proceed.  Follow-up will be scheduled appropriately.     Scribe Attestation  By signing my name below, I, Jonas Garza,   attest that this documentation has been prepared under the direction and in the presence of Francis Miranda MD.         [1]   Past Medical History:  Diagnosis Date    Fibroid     HYSTERECTOMY    Fractures     Right ankle    Hypertension     Joint pain     Two knee replacements    Urinary tract infection     Occasionally   [2]   Past Surgical History:  Procedure Laterality Date    ANKLE FRACTURE SURGERY  ?    CATARACT EXTRACTION  2021    COLONOSCOPY  2022    said more can't be done due to curvature of colon.    HYSTERECTOMY  1997    JOINT REPLACEMENT  2010, 2011    Knee replacements    OTHER SURGICAL HISTORY  2010,2011, ankle?, 1997    knee replacements, right ankle, hysterectomy    TONSILLECTOMY  about 6 years old   [3]   Family  History  Problem Relation Name Age of Onset    Heart disease Father Mike Smith     Hypertension Father Mike Smith    [4]   Social History  Tobacco Use    Smoking status: Never    Smokeless tobacco: Never   Vaping Use    Vaping status: Never Used   Substance Use Topics    Alcohol use: Yes     Comment: Glass of wine rarely    Drug use: Never

## 2025-06-05 ENCOUNTER — APPOINTMENT (OUTPATIENT)
Dept: UROLOGY | Facility: CLINIC | Age: 80
End: 2025-06-05
Payer: MEDICARE

## 2025-06-05 DIAGNOSIS — N20.0 KIDNEY STONE: Primary | ICD-10-CM

## 2025-06-05 PROCEDURE — 1126F AMNT PAIN NOTED NONE PRSNT: CPT | Performed by: UROLOGY

## 2025-06-05 PROCEDURE — 99213 OFFICE O/P EST LOW 20 MIN: CPT | Performed by: UROLOGY

## 2025-06-05 PROCEDURE — 1159F MED LIST DOCD IN RCRD: CPT | Performed by: UROLOGY

## 2025-06-05 PROCEDURE — 1036F TOBACCO NON-USER: CPT | Performed by: UROLOGY

## 2025-06-05 PROCEDURE — G2211 COMPLEX E/M VISIT ADD ON: HCPCS | Performed by: UROLOGY

## 2025-06-05 PROCEDURE — 1160F RVW MEDS BY RX/DR IN RCRD: CPT | Performed by: UROLOGY

## 2025-06-05 ASSESSMENT — PAIN SCALES - GENERAL: PAINLEVEL_OUTOF10: 0-NO PAIN

## 2025-06-07 PROBLEM — N20.0 KIDNEY STONES: Status: RESOLVED | Noted: 2024-09-30 | Resolved: 2025-06-07

## 2025-06-26 ENCOUNTER — APPOINTMENT (OUTPATIENT)
Dept: UROLOGY | Facility: CLINIC | Age: 80
End: 2025-06-26
Payer: MEDICARE

## 2025-07-17 ENCOUNTER — OFFICE VISIT (OUTPATIENT)
Dept: URGENT CARE | Age: 80
End: 2025-07-17
Payer: MEDICARE

## 2025-07-17 VITALS
TEMPERATURE: 98.1 F | SYSTOLIC BLOOD PRESSURE: 150 MMHG | OXYGEN SATURATION: 97 % | BODY MASS INDEX: 25.92 KG/M2 | HEART RATE: 62 BPM | DIASTOLIC BLOOD PRESSURE: 84 MMHG | WEIGHT: 151 LBS

## 2025-07-17 DIAGNOSIS — N30.01 ACUTE CYSTITIS WITH HEMATURIA: Primary | ICD-10-CM

## 2025-07-17 PROBLEM — R68.89 TOTAL SELF-CARE DEFICIT: Status: ACTIVE | Noted: 2025-07-17

## 2025-07-17 PROBLEM — H52.13 MYOPIA, BILATERAL: Status: ACTIVE | Noted: 2023-07-11

## 2025-07-17 PROBLEM — H52.203 ASTIGMATISM OF BOTH EYES: Status: ACTIVE | Noted: 2023-07-11

## 2025-07-17 PROBLEM — J01.10 ACUTE NON-RECURRENT FRONTAL SINUSITIS: Status: ACTIVE | Noted: 2025-07-17

## 2025-07-17 PROBLEM — H26.493 POSTERIOR CAPSULAR OPACIFICATION OF BOTH EYES, NOT OBSCURING VISION: Status: ACTIVE | Noted: 2023-07-11

## 2025-07-17 PROBLEM — N39.0 ACUTE URINARY TRACT INFECTION: Status: ACTIVE | Noted: 2025-01-31

## 2025-07-17 PROBLEM — Z96.1 PSEUDOPHAKIA: Status: ACTIVE | Noted: 2023-07-11

## 2025-07-17 PROBLEM — H04.123 DRY EYE SYNDROME OF BOTH EYES: Status: ACTIVE | Noted: 2023-07-11

## 2025-07-17 PROBLEM — H43.813 VITREOUS DEGENERATION OF BOTH EYES: Status: ACTIVE | Noted: 2023-07-11

## 2025-07-17 PROBLEM — J45.909 ASTHMA: Status: ACTIVE | Noted: 2025-07-17

## 2025-07-17 PROBLEM — H52.4 PRESBYOPIA: Status: ACTIVE | Noted: 2023-07-11

## 2025-07-17 LAB
POC APPEARANCE, URINE: ABNORMAL
POC BILIRUBIN, URINE: NEGATIVE
POC BLOOD, URINE: ABNORMAL
POC COLOR, URINE: ABNORMAL
POC GLUCOSE, URINE: NEGATIVE MG/DL
POC KETONES, URINE: NEGATIVE MG/DL
POC LEUKOCYTES, URINE: ABNORMAL
POC NITRITE,URINE: NEGATIVE
POC PH, URINE: 6 PH
POC PROTEIN, URINE: ABNORMAL MG/DL
POC SPECIFIC GRAVITY, URINE: 1.01
POC UROBILINOGEN, URINE: 0.2 EU/DL

## 2025-07-17 RX ORDER — NITROFURANTOIN 25; 75 MG/1; MG/1
100 CAPSULE ORAL 2 TIMES DAILY
Qty: 14 CAPSULE | Refills: 0 | Status: SHIPPED | OUTPATIENT
Start: 2025-07-17 | End: 2025-07-24

## 2025-07-17 RX ORDER — ALENDRONATE SODIUM 70 MG/1
70 TABLET ORAL
COMMUNITY

## 2025-07-17 NOTE — PROGRESS NOTES
Subjective   Patient ID: Katreina Abdi is a 80 y.o. female. They present today with a chief complaint of UTI (Sx X4 days).      History of Present Illness  Subjective  Katerina Abdi is a 80 y.o. female who complains of frequency for 4 days. Patient does not have a history of recurrent UTI or pyelonephritis.    Objective  /84   Pulse 62   Temp 36.7 °C (98.1 °F) (Oral)   Wt 68.5 kg (151 lb)   SpO2 97%   BMI 25.92 kg/m²    General: alert and oriented, in no acute distress  Abdomen: soft, non-tender, without masses or organomegaly  Back: CVA tenderness absent    Laboratory:   Urine dipstick shows +leukocytes, +protein, +blood.    Micro exam: pending.    Assessment/Plan  Diagnoses and associated orders for this visit:    · Acute cystitis with hematuria   POCT UA Automated manually resulted   nitrofurantoin, macrocrystal-monohydrate, (Macrobid) 100 mg capsule; Take 1 capsule (100 mg) by mouth 2 times a day for 7 days.   Urine Culture            History provided by:  Patient and medical records  UTI        Past Medical History  Allergies as of 07/17/2025 - Reviewed 07/17/2025   Allergen Reaction Noted    Cefprozil Unknown 08/18/2009    Estradiol Itching 01/03/2025    Indocin [indomethacin] Unknown 11/06/2024       Prescriptions Prior to Admission[1]     Current Medications[2]    Problem List[3]    Surgical History[4]     reports that she has never smoked. She has never used smokeless tobacco. She reports current alcohol use. She reports that she does not use drugs.    Review of Systems  As noted in HPI. ROS otherwise negative unless noted.       Objective    Vitals:    07/17/25 1128   BP: 150/84   Pulse: 62   Temp: 36.7 °C (98.1 °F)   TempSrc: Oral   SpO2: 97%   Weight: 68.5 kg (151 lb)     No LMP recorded. Patient has had a hysterectomy.    Physical Exam  Vitals and nursing note reviewed.   Constitutional:       Appearance: She is not ill-appearing or toxic-appearing.   HENT:      Head: Normocephalic and  atraumatic.     Cardiovascular:      Rate and Rhythm: Normal rate and regular rhythm.      Pulses: Normal pulses.      Heart sounds: Normal heart sounds.   Pulmonary:      Effort: Pulmonary effort is normal.      Breath sounds: Normal breath sounds.   Abdominal:      General: Abdomen is flat. Bowel sounds are normal.      Palpations: Abdomen is soft.      Tenderness: There is no abdominal tenderness. There is no right CVA tenderness or left CVA tenderness.     Skin:     General: Skin is warm and dry.      Capillary Refill: Capillary refill takes less than 2 seconds.     Neurological:      Mental Status: She is alert and oriented to person, place, and time.     Psychiatric:         Behavior: Behavior normal.           Procedures    Point of Care Test & Imaging Results from this visit      Diagnostic study results (if any) were reviewed.  (If applicable) preliminary radiology reading: [none]    Assessment/Plan   Allergies, medications, history, and pertinent labs/EKGs/Imaging reviewed.        Medical Decision Making  Risks, benefits, and alternatives of the medications and treatment plan prescribed today were discussed, and patient expressed understanding. Plan follow up as discussed or as needed if any worsening symptoms or change in condition. Reinforced red flags including (but not limited to): severe or worsening pain; difficulty swallowing; stiff neck; shortness of breath; coughing or vomiting blood; chest pain; and new or increased fever are indications to go to the Emergency Department.  At time of discharge patient was clinically well-appearing and HDS for outpatient management. The patient and/or family was educated regarding diagnosis, supportive care, OTC and Rx medications. The patient and/or family was given the opportunity to ask questions prior to discharge.  They verbalized understanding of my discussion of the plans for treatment, expected course, indications to return to  or seek further evaluation  in ED, and the need for timely follow up as directed.   They were provided with a work/school excuse if requested. The after-visit summary was given to the patient and care instructions were reviewed with the patient. All questions were answered and the patient verbalized understanding of the plan of care for today.  Plan:  Recent visit notes reviewed  Meds as above  Increase clear fluids  Pcp follow up this week if not improving or worsening  ER visit anytime 24/7 for acute worsening or changing condition    Orders and Diagnoses  Diagnoses and all orders for this visit:  Acute cystitis with hematuria  -     POCT UA Automated manually resulted  -     nitrofurantoin, macrocrystal-monohydrate, (Macrobid) 100 mg capsule; Take 1 capsule (100 mg) by mouth 2 times a day for 7 days.  -     Urine Culture      Medical Admin Record      Follow Up Instructions  No follow-ups on file.    Patient disposition: Home  ELIZA Florentino-CNP           [1] (Not in a hospital admission)   [2]   Current Outpatient Medications   Medication Sig Dispense Refill    ALPRAZolam (Xanax) 0.25 mg tablet Take 1 tablet (0.25 mg) by mouth every 8 hours if needed.      aspirin 81 mg chewable tablet Chew 1 tablet (81 mg) once daily.      carvedilol (Coreg) 6.25 mg tablet Take 1 tablet (6.25 mg) by mouth 2 times a day.      losartan-hydrochlorothiazide (Hyzaar) 100-12.5 mg tablet Take 1 tablet by mouth once daily in the evening.      multivitamin with iron (Cerovite Jr) chewable tablet Chew 1 tablet once daily.      alendronate (Fosamax) 70 mg tablet Take 70 mg by mouth 1 (one) time per week.      estradiol (Estrace) 0.01 % (0.1 mg/gram) vaginal cream Insert a pea-sized amount into vagina three times per week at bedtime (Patient not taking: Reported on 6/5/2025) 42.5 g 3    Myrbetriq 50 mg tablet extended release 24 hr 24 hr tablet Take 1 tablet (50 mg) by mouth once daily. (Patient not taking: Reported on 1/3/2025)      nitrofurantoin,  macrocrystal-monohydrate, (Macrobid) 100 mg capsule Take 1 capsule (100 mg) by mouth 2 times a day for 7 days. 14 capsule 0    prasterone, dhea, (Intrarosa) 6.5 mg insert Insert 6.5 mg into the vagina once daily at bedtime. (Patient not taking: Reported on 6/5/2025) 30 each 5     No current facility-administered medications for this visit.   [3]   Patient Active Problem List  Diagnosis    Acute upper respiratory infection    Hypertension    Kidney stone    S/P [id number]    Vaginal atrophy    UTI symptoms    Calculus, bladder    Microscopic hematuria    Acute non-recurrent frontal sinusitis    Acute urinary tract infection    Asthma    Astigmatism of both eyes    Dry eye syndrome of both eyes    Myopia, bilateral    Posterior capsular opacification of both eyes, not obscuring vision    Presbyopia    Pseudophakia    Total self-care deficit    Vaginal dryness    Vitreous degeneration of both eyes   [4]   Past Surgical History:  Procedure Laterality Date    ANKLE FRACTURE SURGERY  ?    CATARACT EXTRACTION  2021    COLONOSCOPY  2022    said more can't be done due to curvature of colon.    HYSTERECTOMY  1997    JOINT REPLACEMENT  2010, 2011    Knee replacements    OTHER SURGICAL HISTORY  2010,2011, ankle?, 1997    knee replacements, right ankle, hysterectomy    TONSILLECTOMY  about 6 years old

## 2025-07-19 LAB — BACTERIA UR CULT: ABNORMAL

## 2025-07-25 ENCOUNTER — OFFICE VISIT (OUTPATIENT)
Dept: URGENT CARE | Age: 80
End: 2025-07-25
Payer: MEDICARE

## 2025-07-25 VITALS
SYSTOLIC BLOOD PRESSURE: 137 MMHG | TEMPERATURE: 98 F | HEART RATE: 68 BPM | RESPIRATION RATE: 16 BRPM | OXYGEN SATURATION: 97 % | BODY MASS INDEX: 24.99 KG/M2 | WEIGHT: 150 LBS | HEIGHT: 65 IN | DIASTOLIC BLOOD PRESSURE: 83 MMHG

## 2025-07-25 DIAGNOSIS — N30.00 ACUTE CYSTITIS WITHOUT HEMATURIA: Primary | ICD-10-CM

## 2025-07-25 DIAGNOSIS — N39.0 URINARY TRACT INFECTION WITHOUT HEMATURIA, SITE UNSPECIFIED: ICD-10-CM

## 2025-07-25 LAB
POC APPEARANCE, URINE: ABNORMAL
POC BILIRUBIN, URINE: NEGATIVE
POC BLOOD, URINE: ABNORMAL
POC COLOR, URINE: YELLOW
POC GLUCOSE, URINE: NEGATIVE MG/DL
POC KETONES, URINE: NEGATIVE MG/DL
POC LEUKOCYTES, URINE: ABNORMAL
POC NITRITE,URINE: NEGATIVE
POC PH, URINE: 6 PH
POC PROTEIN, URINE: ABNORMAL MG/DL
POC SPECIFIC GRAVITY, URINE: 1.02
POC UROBILINOGEN, URINE: 0.2 EU/DL

## 2025-07-25 RX ORDER — AMOXICILLIN AND CLAVULANATE POTASSIUM 875; 125 MG/1; MG/1
875 TABLET, FILM COATED ORAL 2 TIMES DAILY
Qty: 20 TABLET | Refills: 0 | Status: SHIPPED | OUTPATIENT
Start: 2025-07-25

## 2025-07-25 ASSESSMENT — ENCOUNTER SYMPTOMS
LOSS OF SENSATION IN FEET: 0
OCCASIONAL FEELINGS OF UNSTEADINESS: 0
DEPRESSION: 0

## 2025-07-25 ASSESSMENT — PATIENT HEALTH QUESTIONNAIRE - PHQ9
2. FEELING DOWN, DEPRESSED OR HOPELESS: NOT AT ALL
1. LITTLE INTEREST OR PLEASURE IN DOING THINGS: NOT AT ALL
SUM OF ALL RESPONSES TO PHQ9 QUESTIONS 1 AND 2: 0

## 2025-07-25 NOTE — PROGRESS NOTES
"Subjective   Patient ID: Katerina Abdi is a 80 y.o. female. She presents today with a chief complaint of Urinary Problem (X1 wk still having issues agter taking meds ).    History of Present Illness  Subjective  Katerina Abdi is a 80 y.o. female who complains of dysuria, frequency, and urgency for 2 days. Patient does have a history of recurrent UTI or pyelonephritis. Patient was seen here on 7/17/25 with UTI symptoms. She was prescribed a 7 day course of Macrobid, which she finished 2 days ago. Urine culture grew E.coli, which was sensitive to the Macrobid that was prescribed. Her symptoms were improving, then worsened yesterday.       Objective  /83   Pulse 68   Temp 36.7 °C (98 °F)   Resp 16   Ht 1.651 m (5' 5\")   Wt 68 kg (150 lb)   LMP  (LMP Unknown)   SpO2 97%   BMI 24.96 kg/m²    General: alert and oriented, in no acute distress  Abdomen: soft, non-tender, without masses or organomegaly  Back: CVA tenderness absent    Laboratory:   Urine dipstick shows +leukocytes, +blood.      Assessment/Plan  Diagnoses and associated orders for this visit:    · Urinary tract infection without hematuria, site unspecified   POCT UA Automated manually resulted                Past Medical History  Allergies as of 07/25/2025 - Reviewed 07/25/2025   Allergen Reaction Noted    Cefprozil Unknown 08/18/2009    Estradiol Itching 01/03/2025    Indocin [indomethacin] Unknown 11/06/2024       Prescriptions Prior to Admission[1]     Medical History[2]    Surgical History[3]     reports that she has never smoked. She has never been exposed to tobacco smoke. She has never used smokeless tobacco. She reports current alcohol use. She reports that she does not use drugs.    Review of Systems  Review of Systems                               Objective    Vitals:    07/25/25 1553   BP: 137/83   Pulse: 68   Resp: 16   Temp: 36.7 °C (98 °F)   SpO2: 97%   Weight: 68 kg (150 lb)   Height: 1.651 m (5' 5\")     No LMP recorded (lmp " unknown). Patient has had a hysterectomy.    Physical Exam    Procedures    Point of Care Test & Imaging Results from this visit  No results found for this visit on 07/25/25.   Imaging  No results found.    Cardiology, Vascular, and Other Imaging  No other imaging results found for the past 2 days      Diagnostic study results (if any) were reviewed by Maya Jacinto MD.    Assessment/Plan   Allergies, medications, history, and pertinent labs/EKGs/Imaging reviewed by Maya Jacinto MD.     Medical Decision Making      Orders and Diagnoses  Diagnoses and all orders for this visit:  Urinary tract infection without hematuria, site unspecified  -     POCT UA Automated manually resulted      Medical Admin Record      Patient disposition: Home    Electronically signed by Maya Jacinto MD  4:05 PM           [1] (Not in a hospital admission)  [2]   Past Medical History:  Diagnosis Date    Fibroid     HYSTERECTOMY    Fractures     Right ankle    Hypertension     Joint pain     Two knee replacements    Urinary tract infection     Occasionally   [3]   Past Surgical History:  Procedure Laterality Date    ANKLE FRACTURE SURGERY  ?    CATARACT EXTRACTION  2021    COLONOSCOPY  2022    said more can't be done due to curvature of colon.    HYSTERECTOMY  1997    JOINT REPLACEMENT  2010, 2011    Knee replacements    OTHER SURGICAL HISTORY  2010,2011, ankle?, 1997    knee replacements, right ankle, hysterectomy    TONSILLECTOMY  about 6 years old

## 2025-07-25 NOTE — PATIENT INSTRUCTIONS
Antibiotics as directed; take with food  Urine culture pending  Follow up with new or worsening symptoms

## 2025-07-27 LAB — BACTERIA UR CULT: ABNORMAL

## 2025-07-28 LAB — BACTERIA UR CULT: ABNORMAL

## 2025-08-09 ENCOUNTER — OFFICE VISIT (OUTPATIENT)
Dept: URGENT CARE | Age: 80
End: 2025-08-09
Payer: MEDICARE

## 2025-08-09 VITALS
OXYGEN SATURATION: 97 % | HEIGHT: 65 IN | DIASTOLIC BLOOD PRESSURE: 80 MMHG | HEART RATE: 69 BPM | WEIGHT: 150 LBS | TEMPERATURE: 98.1 F | RESPIRATION RATE: 20 BRPM | SYSTOLIC BLOOD PRESSURE: 160 MMHG | BODY MASS INDEX: 24.99 KG/M2

## 2025-08-09 DIAGNOSIS — N39.0 RECURRENT UTI: Primary | ICD-10-CM

## 2025-08-09 DIAGNOSIS — R35.0 FREQUENCY OF MICTURITION: ICD-10-CM

## 2025-08-09 LAB
POC APPEARANCE, URINE: ABNORMAL
POC BILIRUBIN, URINE: NEGATIVE
POC BLOOD, URINE: NEGATIVE
POC COLOR, URINE: YELLOW
POC GLUCOSE, URINE: ABNORMAL MG/DL
POC KETONES, URINE: NEGATIVE MG/DL
POC LEUKOCYTES, URINE: ABNORMAL
POC NITRITE,URINE: NEGATIVE
POC PH, URINE: 6 PH
POC PROTEIN, URINE: NEGATIVE MG/DL
POC SPECIFIC GRAVITY, URINE: 1.01
POC UROBILINOGEN, URINE: 0.2 EU/DL

## 2025-08-09 RX ORDER — SULFAMETHOXAZOLE AND TRIMETHOPRIM 800; 160 MG/1; MG/1
1 TABLET ORAL 2 TIMES DAILY
Qty: 14 TABLET | Refills: 0 | Status: SHIPPED | OUTPATIENT
Start: 2025-08-09 | End: 2025-08-16

## 2025-08-09 NOTE — PATIENT INSTRUCTIONS
Increase fluids and rest  Medication as directed  AZO as needed for symptoms  Call for culture results in 2-3 days if no better  Show to ER for abdominal pains or fevers over 101  Follow-up with urology.  Referral placed

## 2025-08-13 LAB — BACTERIA UR CULT: ABNORMAL

## 2025-12-11 ENCOUNTER — APPOINTMENT (OUTPATIENT)
Dept: UROLOGY | Facility: CLINIC | Age: 80
End: 2025-12-11
Payer: MEDICARE

## (undated) DEVICE — IRRIGATION SET, CYSTOSCOPY, F/CONSTANT/INTERMITTENT, 8 GTT/CC, 77 IN

## (undated) DEVICE — Device

## (undated) DEVICE — CATHETER, URETHRAL, FOLEY, 2 WAY, BARDEX LUBRICATH, SHORT LENGTH, 18 FR, 5 CC, LATEX

## (undated) DEVICE — BAG, DRAINAGE, ANTI-REFLUX CHAMBER, 2000ML

## (undated) DEVICE — SOLUTION, IRRIGATION, USP, SODIUM CHLORIDE 0.9%, 3000 ML, BAG

## (undated) DEVICE — CORD, MONOPOLAR, HIGH FREQUENCY, W/8MM PLUG F/VALLEYLAB, 8FT/244CM, STRL

## (undated) DEVICE — HOLMIUM MASTERPULSE HF

## (undated) DEVICE — IRRIGATION SET, CYSTOSCOPY, TURP, Y, CONTINUOUS, 81 IN

## (undated) DEVICE — GLOVE, PROTEXIS PI CLASSIC, SZ-7.5, PF, LF

## (undated) DEVICE — UROVAC BLADDER EVACUATOR